# Patient Record
Sex: MALE | Race: BLACK OR AFRICAN AMERICAN | NOT HISPANIC OR LATINO | Employment: UNEMPLOYED | URBAN - METROPOLITAN AREA
[De-identification: names, ages, dates, MRNs, and addresses within clinical notes are randomized per-mention and may not be internally consistent; named-entity substitution may affect disease eponyms.]

---

## 2021-09-26 ENCOUNTER — HOSPITAL ENCOUNTER (EMERGENCY)
Facility: HOSPITAL | Age: 26
Discharge: HOME | End: 2021-09-26
Attending: EMERGENCY MEDICINE | Admitting: EMERGENCY MEDICINE

## 2021-09-26 VITALS
DIASTOLIC BLOOD PRESSURE: 81 MMHG | TEMPERATURE: 98.5 F | SYSTOLIC BLOOD PRESSURE: 129 MMHG | HEIGHT: 67 IN | HEART RATE: 62 BPM | BODY MASS INDEX: 24.48 KG/M2 | OXYGEN SATURATION: 98 % | RESPIRATION RATE: 16 BRPM | WEIGHT: 156 LBS

## 2021-09-26 DIAGNOSIS — Z20.822 ENCOUNTER FOR LABORATORY TESTING FOR COVID-19 VIRUS: ICD-10-CM

## 2021-09-26 LAB — SARS-COV-2 RNA RESP QL NAA+PROBE: NEGATIVE

## 2021-09-26 PROCEDURE — 99283 EMERGENCY DEPT VISIT LOW MDM: CPT

## 2021-09-26 PROCEDURE — C9803 HOPD COVID-19 SPEC COLLECT: HCPCS

## 2021-09-26 PROCEDURE — 87635 SARS-COV-2 COVID-19 AMP PRB: CPT | Performed by: EMERGENCY MEDICINE

## 2021-09-26 ASSESSMENT — MIFFLIN-ST. JEOR: SCORE: 1656.24

## 2021-09-26 NOTE — ED TRIAGE NOTES
Pt requesting Covid 19 test. States family member in the house just tested positive, he is not having any symptoms.

## 2021-09-26 NOTE — Clinical Note
Marcus Olguin was seen and treated in our emergency department on 9/26/2021.  He may return to work on 10/06/2021.       If you have any questions or concerns, please don't hesitate to call.      Marisol Britt MD

## 2021-09-26 NOTE — ED PROVIDER NOTES
EMERGENCY DEPARTMENT ENCOUNTER      NAME: Marcus Olguin  AGE: 26 year old male  YOB: 1995  MRN: [id number]  EVALUATION DATE & TIME: No admission date for patient encounter.    PCP: No primary care provider on file.    ED PROVIDER: Marisol Britt MD    Chief Complaint   Patient presents with     Covid 19 Testing     requesting covid test         FINAL IMPRESSION:  1. Encounter for laboratory testing for COVID-19 virus          ED COURSE & MEDICAL DECISION MAKING:    Pertinent Labs & Imaging studies reviewed. (See chart for details)  26 year old male with history of asthma who presents to the Emergency Department for evaluation of request for Covid testing after being exposed to his roommate who is positive for Covid.  Patient himself asymptomatic but not vaccinated.  Covid swab sent.  Vital signs stable.  Covid swab pending at time of dictation but given the fact that he has had close exposure and is unvaccinated patient getting extracted to quarantine and self isolate for the next 10 days regardless of his Covid test result.         3:17 PM I met with the patient for the initial interview and physical examination. Discussed plan for treatment and workup in the ED.   7:13 PM Discussed with the patient and all questioned fully answered. He will call me if any problems arise.    At the conclusion of the encounter I discussed the results of all of the tests and the disposition. The questions were answered. The patient or family acknowledged understanding and was agreeable with the care plan.    PPE: Provider wore gloves, N95 mask, eye protection, surgical cap, and paper mask.    MEDICATIONS GIVEN IN THE EMERGENCY:  Medications - No data to display    NEW PRESCRIPTIONS STARTED AT TODAY'S ER VISIT  There are no discharge medications for this patient.         =================================================================    HPI    Patient information was obtained from: Patient    Use of Intrepreter:  "N/A       Marcus Olguin is a 26 year old male with a pertinent medical history of mild intermittent asthma who presents to the ED via private car for Covid 19 testing.    Yesterday (9/25), the patient's roommate tested positive for Covid, and the patient was around him previously. The patient shows no symptoms, but is not vaccinated for Covid. He is an otherwise healthy and denies any other complaints.    REVIEW OF SYSTEMS  Constitutional:  Denies fever, chills, weight loss or weakness.  Eyes:  No pain, discharge, redness  HENT:  Denies sore throat, ear pain, congestion  Respiratory: No SOB, wheeze or cough  Cardiovascular:  No CP, palpitations  GI:  Denies abdominal pain, nausea, vomiting, diarrhea  Neurologic:  Denies headache, focal weakness or sensory changes  All other systems negative unless noted in HPI.      PAST MEDICAL HISTORY:  Past Medical History:   Diagnosis Date     Uncomplicated asthma        PAST SURGICAL HISTORY:  History reviewed. No pertinent surgical history.    CURRENT MEDICATIONS:    None       ALLERGIES:  Allergies   Allergen Reactions     Seasonal Allergies        FAMILY HISTORY:  History reviewed. No pertinent family history.    SOCIAL HISTORY:  Social History     Tobacco Use     Smoking status: None   Substance Use Topics     Alcohol use: None     Drug use: None        VITALS:  Patient Vitals for the past 24 hrs:   BP Temp Temp src Pulse Resp SpO2 Height Weight   09/26/21 1442 129/81 98.5  F (36.9  C) Oral 62 16 98 % 1.702 m (5' 7\") 70.8 kg (156 lb)       PHYSICAL EXAM    General Appearance: Well-appearing, well-nourished, no acute distress   Head:  Normocephalic  Eyes:  conjunctiva/corneas clear  ENT:  membranes are moist without pallor  Neck:  Supple  Cardio:  Regular rate and rhythm  Pulm:  No respiratory distress  Extremities: Moves all extremities normally, normal gait  Skin:  Skin warm, dry, no rashes  Neuro:  Alert and oriented ×3, moving all extremities, no gross sensory " defects      The creation of this record is based on the scribe s observations of the work being performed by Marisol Britt MD and the provider s statements to them. It was created on his behalf by Felipa Agrawal, a trained medical scribe. This document has been checked and approved by the attending provider.    Marisol Britt MD  Emergency Medicine  [facility] EMERGENCY DEPARTMENT  [address] [place] [id number] [phone]  Dept: [phone]       Marisol Britt MD  09/26/21 7566

## 2022-01-11 ENCOUNTER — TELEPHONE (OUTPATIENT)
Dept: EMERGENCY MEDICINE | Facility: HOSPITAL | Age: 27
End: 2022-01-11

## 2022-01-11 ENCOUNTER — HOSPITAL ENCOUNTER (EMERGENCY)
Facility: HOSPITAL | Age: 27
Discharge: HOME | End: 2022-01-11
Admitting: PHYSICIAN ASSISTANT
Payer: OTHER GOVERNMENT

## 2022-01-11 VITALS
SYSTOLIC BLOOD PRESSURE: 126 MMHG | HEART RATE: 88 BPM | OXYGEN SATURATION: 97 % | BODY MASS INDEX: 24.33 KG/M2 | HEIGHT: 67 IN | WEIGHT: 155 LBS | TEMPERATURE: 99.9 F | RESPIRATION RATE: 20 BRPM | DIASTOLIC BLOOD PRESSURE: 80 MMHG

## 2022-01-11 DIAGNOSIS — Z20.822 SUSPECTED 2019 NOVEL CORONAVIRUS INFECTION: ICD-10-CM

## 2022-01-11 LAB
ATRIAL RATE - MUSE: 67 BPM
DIASTOLIC BLOOD PRESSURE - MUSE: NORMAL MMHG
FLUAV RNA SPEC QL NAA+PROBE: NEGATIVE
FLUBV RNA RESP QL NAA+PROBE: NEGATIVE
INTERPRETATION ECG - MUSE: NORMAL
P AXIS - MUSE: 74 DEGREES
PR INTERVAL - MUSE: 154 MS
QRS DURATION - MUSE: 104 MS
QT - MUSE: 376 MS
QTC - MUSE: 397 MS
R AXIS - MUSE: -44 DEGREES
SARS-COV-2 RNA RESP QL NAA+PROBE: POSITIVE
SYSTOLIC BLOOD PRESSURE - MUSE: NORMAL MMHG
T AXIS - MUSE: 47 DEGREES
VENTRICULAR RATE- MUSE: 67 BPM

## 2022-01-11 PROCEDURE — 87636 SARSCOV2 & INF A&B AMP PRB: CPT | Performed by: STUDENT IN AN ORGANIZED HEALTH CARE EDUCATION/TRAINING PROGRAM

## 2022-01-11 PROCEDURE — C9803 HOPD COVID-19 SPEC COLLECT: HCPCS

## 2022-01-11 PROCEDURE — 99284 EMERGENCY DEPT VISIT MOD MDM: CPT

## 2022-01-11 PROCEDURE — 93005 ELECTROCARDIOGRAM TRACING: CPT | Performed by: EMERGENCY MEDICINE

## 2022-01-11 ASSESSMENT — ENCOUNTER SYMPTOMS
HEADACHES: 1
CHEST TIGHTNESS: 1
DIARRHEA: 0
COUGH: 0
FEVER: 1
MYALGIAS: 1
NAUSEA: 0
FATIGUE: 1
APPETITE CHANGE: 0
VOMITING: 0
NUMBNESS: 0
SORE THROAT: 0
SHORTNESS OF BREATH: 1
ABDOMINAL PAIN: 0

## 2022-01-11 ASSESSMENT — MIFFLIN-ST. JEOR: SCORE: 1651.71

## 2022-01-11 NOTE — ED TRIAGE NOTES
Patient here for fever, body aches, weakness. SOB. No cough. Appetite decreased. Has not been vaccinated

## 2022-01-11 NOTE — Clinical Note
Marcus Olguin was seen and treated in our emergency department on 1/11/2022.  He may return to work on 01/15/2022.       If you have any questions or concerns, please don't hesitate to call.      Divya Ramirez PA-C

## 2022-01-11 NOTE — ED PROVIDER NOTES
Emergency Department Encounter   NAME: Marcus Olguin ; AGE: 26 year old male ; YOB: 1995 ; MRN: [id number] ; EVALUATION DATE & TIME: No admission date for patient encounter. ; PCP: No Ref-Primary, Physician   ED PROVIDER: Divya Ramirez PA-C    Chief Complaint   Patient presents with     Shortness of Breath     Fever       Medical Decision Making & Final Diagnosis     1. Suspected 2019 novel coronavirus infection         ED Course as of 01/11/22 0841   Tue Jan 11, 2022   0837 Marcus is a 26 year old male with self-reported PMH of asthma who presents to the ED for evaluation of COVID-like symptoms. Reports subjective fever, myalgia, intermittent headache, shortness of breath with exertion, and chest tightness.    My exam is notable for normal vital signs and a nontoxic appearing man.  Clear lung sounds.  No peripheral edema or TTP to bilateral lower extremities.   0837 EKG reveals normal sinus rhythm with sinus arrhythmia.  No acute ST elevation or depression.  No pathologic arrhythmia.  COVID and influenza test pending.   0838 Patient's symptoms most consistent with viral illness.  Suspicious for COVID-19 given unvaccinated status, global pandemic, and recent close family exposure.  Influenza also possible.  He denies any sore throat suspicious for mono or strep.  Lung sounds are clear and he is not coughing.  He has only had symptoms for the last 2 days.  Do not believe there is indication for chest x-ray at this time.  Low suspicion of pneumonia.  Nothing to suggest pneumothorax or pleural effusion.  He has no history of VTE and is PERC negative.  He has no risk factors for ACS.  Chest pain is not exertional and more described as chest tightness associated with mucus buildup.  EKG without evidence of pericarditis or ischemic changes.  EKG also without evidence of cardiac arrhythmia as source of exertional shortness of breath.  No risk factors for severe anemia or metabolic derangement as source of  shortness of breath.  Do not believe there is indication for lab evaluation at this time.  I do think this patient is appropriate for outpatient management and follow-up with primary care.  We discussed symptomatic management for home and I encouraged him to avoid smoking marijuana and drinking liquor while he is not feeling well.  He was given written and verbal discharge instructions.          ED Course   8:24 AM I met and introduced myself to the patient. I gathered initial history and performed my physical exam. We discussed plan for initial workup. PPE worn including N95 mask, surgical gloves, surgical cap.  8:30 AM Discussed plans for discharge.      MEDICATIONS GIVEN IN THE EMERGENCY:   Medications - No data to display   NEW PRESCRIPTIONS STARTED AT TODAY'S ER VISIT:  New Prescriptions    No medications on file     =================================================================   History   Patient information was obtained from: Patient   Use of Intrepreter: N/A  Marcus MARILUZ Olguin is a 26 year old male with self-reported PMH of asthma who presents to the ED for evaluation of COVID-like symptoms.   patient reports his mom currently has COVID and he saw her on Saturday and gave her a hug and kiss.  He is unvaccinated.  Symptoms started on Sunday.  Notes decreased appetite, subjective fever, extreme fatigue, myalgias, intermittent mild headaches, chest tightness and pain that has been constant since Sunday, and shortness of breath with any exertion. Patient notes he has been using marijuana, liquor, and Nyquil for his symptoms.  Denies sore throat, cough, abdominal pain, nausea, vomiting, urinary symptoms, numbness/tingling/swelling in arms or legs, vision changes, diarrhea.  Denies recent surgery or immobilization, history of VTE, cancer diagnosis or treatment, or estrogen use.  ______________________________________________________________________  Past Medical History:   Diagnosis Date     Uncomplicated asthma   "       History reviewed. No pertinent surgical history.    History reviewed. No pertinent family history.    Social History     Tobacco Use     Smoking status: None     Smokeless tobacco: None   Substance Use Topics     Alcohol use: None     Drug use: None       REVIEW OF SYSTEMS:    Review of Systems   Constitutional: Positive for fatigue (\"extreme\") and fever (subjective). Negative for appetite change (decreased appetite).   HENT: Negative for sore throat.    Eyes: Negative for visual disturbance.   Respiratory: Positive for chest tightness (persistent) and shortness of breath (with exertion). Negative for cough.    Cardiovascular: Positive for chest pain (persistent).   Gastrointestinal: Negative for abdominal pain, diarrhea, nausea and vomiting.   Genitourinary: Negative.    Musculoskeletal: Positive for myalgias.        Denies swelling of arms or legs.   Neurological: Positive for headaches (intermittent, mild). Negative for numbness.        Denies paraesthesias.   All other systems reviewed and are negative.        Physical Exam   /80   Pulse 88   Temp 99.9  F (37.7  C)   Resp 20   Ht 1.702 m (5' 7\")   Wt 70.3 kg (155 lb)   SpO2 97%   BMI 24.28 kg/m      Physical Exam  Constitutional:       General: He is not in acute distress.     Appearance: Normal appearance. He is not toxic-appearing.   HENT:      Head: Normocephalic.   Eyes:      Conjunctiva/sclera: Conjunctivae normal.   Cardiovascular:      Rate and Rhythm: Normal rate and regular rhythm.      Heart sounds: Normal heart sounds.   Pulmonary:      Effort: Pulmonary effort is normal. No respiratory distress.      Breath sounds: Normal breath sounds. No wheezing, rhonchi or rales.      Comments: Clear lung sounds.  Abdominal:      General: There is no distension.      Palpations: Abdomen is soft.      Tenderness: There is no abdominal tenderness. There is no guarding or rebound.   Musculoskeletal:         General: No swelling or deformity. " Normal range of motion.      Cervical back: Normal range of motion.      Right lower leg: No tenderness. No edema.      Left lower leg: No tenderness. No edema.   Skin:     General: Skin is warm.   Neurological:      General: No focal deficit present.      Mental Status: He is alert.   Psychiatric:         Mood and Affect: Mood normal.         Lab Work (Reviewed and Interpreted):   Labs Ordered and Resulted from Time of ED Arrival to Time of ED Departure - No data to display    Imaging (Reviewed and Interpreted):   No orders to display       EKG (Reviewed and Interpreted):   Time: 11-JAN-2022 08:15:08  Interpretation: Normal sinus rhythm with sinus arrhythmia. Left axis deviation.  Rate: 67 BPM  AK interval: 154 ms  QRS: 104 ms  QTC: 397 ms  ST changes: No STEMI    No previous available for comparison.    EKG results reviewed and interpreted by Dr. Ayaz Dowd, ED MD.     IKb, am serving as a scribe to document services personally performed by Divya Ramirez PA-C, based on my observation and the provider's statements to me. Divya SPICER PA-C attest that Kb Shermanis acting in a scribe capacity, has observed my performance of the services and has documented them in accordance with my direction.     Divya Ramirez PA-C   Emergency Medicine   [facility] EMERGENCY DEPARTMENT  [facility] [facility] [address]  [phone]  Dept: [phone]      Divya Ramirez PA-C  01/11/22 0905

## 2022-01-11 NOTE — DISCHARGE INSTRUCTIONS
You were seen in the emergency department today for symptoms that are suspicious for Covid-19 virus or influenza.    Take care of yourself at home:  -Rest  -Hydrate  -Isolate    For pain or fever you may take:  -Tylenol 1000 mg every 6 hours or 4 times a day.  Max 4000 mg in 24 hours  -Please do not take this medication with alcohol as it canincrease your risk of liver dysfunction.    For pain or fever that is not well controlled with Tylenol you may consider taking ibuprofen 600 mg every 6 hours.  Max 3500 mg in 24 hours.  Please do not take thismedication if you have a history of a GI bleed or kidney dysfunction.     For cough you may use over-the-counter cough medicine or cough drops.  For nasal congestion you may consider taking Mucinex with a large glass of water.    We will call you if your test results are positive, we will send you a letter or MyChart if it is negative.    Regardless of if you have been tested or not:  Patient who have symptoms(cough, fever, or shortness of breath), need to isolate for 5 days from when symptoms started AND 24 hours after fever resolves (without fever reducing medications) AND improvement of respiratory symptoms(whichever is longer).    Isolate yourself at home (in own room/own bathroom if possible)  Do Not allow any visitors  Do Not go to work or school  Do Not go to [employer], centers, shopping, or other public places.  Do Not shake hands.  Avoid close and intimate contact with others (hugging, kissing).  Follow CDC recommendations for household cleaning of frequently touchedservices.     After the initial 5 days, continue to isolate yourself from household members as much as possible. To continue decrease the risk of community spread and exposure, you and anymembers of your household should limit activities in public for 14 days after starting home isolation.     You can reference the following CDC link for helpful home isolation/care  tips:  https://www.cdc.gov/coronavirus/2019-ncov/downloads/10Things.pdf    Protect Others:  Cover Your Mouth and Nose with a mask, disposable tissue or wash cloth to avoid spreading germs to others.  Wash your hands and face frequently with soap and water    For more informationabout COVID19 and options for caring for yourself at home, please visit the CDC website at https://www.cdc.gov/coronavirus/2019-ncov/about/steps-when-sick.html  For more options for care at [facility], please visit our website at [email or web address]    Return to the emergency department if:  -You are unable to say a full sentence without needing to stop and catch your breath  -You develop severe chest pain  -You notice your breathing has become very difficult  -You have a fever (100.4F or above) that is not going down with Tylenol and ibuprofen  - O2 saturation drops below 90% routinely  Or with any other concerning symptom    Follow up with your primary care provider in the next few days to check in.

## 2022-01-12 NOTE — TELEPHONE ENCOUNTER
"Coronavirus (COVID-19) Notification    Caller Name (Patient, parent, daughter/son, grandparent, etc)  The patient      Reason for call  Notify of Positive Coronavirus (COVID-19) lab results, assess symptoms,  review  [facility] recommendations    Lab Result    Lab test:  2019-nCoV rRt-PCR or SARS-CoV-2 PCR    Oropharyngeal AND/OR nasopharyngeal swabs is POSITIVE for 2019-nCoV RNA/SARS-COV-2 PCR (COVID-19 virus)    RN Recommendations/Instructions per [facility] Coronavirus COVID-19 recommendations    Brief introduction script  Introduce self then review script:  \"I am calling on behalf of [employer].  We were notified that your Coronavirus test (COVID-19) for was POSITIVE for the virus.  I have some information to relay to you but first I wanted to mention that the [place] Dept of Health will be contacting you shortly [it's possible MD already called Patient] to talk to you more about how you are feeling and other people you have had contact with who might now also have the virus.  Also,  [facility] is Partnering with the [facility] for Covid-19 research, you may be contacted directly by research staff.\"    Assessment (Inquire about Patient's current symptoms)   Assessment   Current Symptoms at time of phone call: (if no symptoms, document No symptoms] Headache , fever , eyes feel heavy   Symptoms onset (if applicable) 2  Days ago     If at time of call, Patients symptoms hare worsened, the Patient should contact 911 or have someone drive them to Emergency Dept promptly:      If Patient calling 911, inform 911 personal that you have tested positive for the Coronavirus (COVID-19).  Place mask on and await 911 to arrive.    If Emergency Dept, If possible, please have another adult drive you to the Emergency Dept but you need to wear mask when in contact with other people.      Monoclonal Antibody Administration    You may be eligible to receive a new treatment with a monoclonal antibody for " preventing hospitalization in patients at high risk for complications from COVID-19.   This medication is still experimental and available on a limited basis; it is given through an IV and must be given at an infusion center. Please note that not all people who are eligible will receive the medication since it is in limited supply.     Are you interested in being considered for this medication?  No.   Does the patient fit the criteria: No    Review information with Patient    Your result was positive. This means you have COVID-19 (coronavirus).  We have sent you a letter that reviews the information that I'll be reviewing with you now.    How can I protect others?    If you have symptoms: stay home and away from others (self-isolate) until:    You've had no fever--and no medicine that reduces fever--for 1 full day (24 hours). And       Your other symptoms have gotten better. For example, your cough or breathing has improved. And     At least 10 days have passed since your symptoms started. (If you've been told by a doctor that you have a weak immune system, wait 20 days.)     If you don't have symptoms: Stay home and away from others (self-isolate) until at least 10 days have passed since your first positive COVID-19 test. (Date test collected)    During this time:    Stay in your own room, including for meals. Use your own bathroom if you can.    Stay away from others in your home. No hugging, kissing or shaking hands. No visitors.     Don't go to work, school or anywhere else.     Clean  high touch  surfaces often (doorknobs, counters, handles, etc.). Use a household cleaning spray or wipes. You'll find a full list on the EPA website at www.epa.gov/pesticide-registration/list-n-disinfectants-use-against-sars-cov-2.     Cover your mouth and nose with a mask, tissue or other face covering to avoid spreading germs.    Wash your hands and face often with soap and water.    Make a list of people you have been in close  contact with recently, even if either of you wore a face covering.   - Start your list from 2 days before you became ill or had a positive test.  - Include anyone that was within 6 feet of you for a cumulative total of 15 minutes or more in 24 hours. (Example: if you sat next to Power for 5 minutes in the morning and 10 minutes in the afternoon, then you were in close contact for 15 minutes total that day. Power would be added to your list.)    A public health worker will call or text you. It is important that you answer. They will ask you questions about possible exposures to COVID-19, such as people you have been in direct contact with and places you have visited.    Tell the people on your list that you have COVID-19; they should stay away from others for 14 days starting from the last time they were in contact with you (unless you are told something different from a public health worker).     Caregivers in these groups are at risk for severe illness due to COVID-19:  o People 65 years and older  o People who live in a nursing home or long-term care facility  o People with chronic disease (lung, heart, cancer, diabetes, kidney, liver, immunologic)  o People who have a weakened immune system, including those who:  - Are in cancer treatment  - Take medicine that weakens the immune system, such as corticosteroids  - Had a bone marrow or organ transplant  - Have an immune deficiency  - Have poorly controlled HIV or AIDS  - Are obese (body mass index of 40 or higher)  - Smoke regularly    Caregivers should wear gloves while washing dishes, handling laundry and cleaning bedrooms and bathrooms.    Wash and dry laundry with special caution. Don't shake dirty laundry, and use the warmest water setting you can.    If you have a weakened immune system, ask your doctor about other actions you should take.    For more tips, go to www.cdc.gov/coronavirus/2019-ncov/downloads/10Things.pdf.    You should not go back to work until  you meet the guidelines above for ending your home isolation. You don't need to be retested for COVID-19 before going back to work--studies show that you won't spread the virus if it's been at least 10 days since your symptoms started (or 20 days, if you have a weak immune system).    Employers: This document serves as formal notice of your employee's medical guidelines for going back to work. They must meet the above guidelines before going back to work in person.    How can I take care of myself?    1. Get lots of rest. Drink extra fluids (unless a doctor has told you not to).    2. Take Tylenol (acetaminophen) for fever or pain. If you have liver or kidney problems, ask your family doctor if it's okay to take Tylenol.     Take either:     650 mg (two 325 mg pills) every 4 to 6 hours, or     1,000 mg (two 500 mg pills) every 8 hours as needed.     Note: Don't take more than 3,000 mg in one day. Acetaminophen is found in many medicines (both prescribed and over-the-counter medicines). Read all labels to be sure you don't take too much.    For children, check the Tylenol bottle for the right dose (based on their age or weight).    3. If you have other health problems (like cancer, heart failure, an organ transplant or severe kidney disease): Call your specialty clinic if you don't feel better in the next 2 days.    4. Know when to call 911: Emergency warning signs include:    Trouble breathing or shortness of breath    Pain or pressure in the chest that doesn't go away    Feeling confused like you haven't felt before, or not being able to wake up    Bluish-colored lips or face    5. Sign up for [employer]. We know it's scary to hear that you have COVID-19. We want to track your symptoms to make sure you're okay over the next 2 weeks. Please look for an email from [employer]--this is a free, online program that we'll use to keep in touch. To sign up, follow the link in the email. Learn more at  [email or web address].    Where can I get more information?    [facility]: [email or web address]    Coronavirus Basics: www.health.[facility].[place]/diseases/coronavirus/basics.html    What to Do If You're Sick: www.cdc.gov/coronavirus/2019-ncov/about/steps-when-sick.html    Ending Home Isolation: www.cdc.gov/coronavirus/2019-ncov/hcp/disposition-in-home-patients.html     Caring for Someone with COVID-19: www.cdc.gov/coronavirus/2019-ncov/if-you-are-sick/care-for-someone.html     [facility] clinical trials (COVID-19 research studies): [email or web address].[email or web address]-clinical-trials     A Positive COVID-19 letter will be sent via [employer] or the mail. (Exception, no letters sent to Presurgerical/Preprocedure Patients)    Suzi Werner LPN

## 2022-01-17 ENCOUNTER — HOSPITAL ENCOUNTER (EMERGENCY)
Facility: HOSPITAL | Age: 27
Discharge: HOME | End: 2022-01-17
Attending: EMERGENCY MEDICINE

## 2022-03-04 ENCOUNTER — HOSPITAL ENCOUNTER (EMERGENCY)
Facility: HOSPITAL | Age: 27
Discharge: HOME | End: 2022-03-04
Attending: EMERGENCY MEDICINE | Admitting: EMERGENCY MEDICINE

## 2022-03-04 VITALS
BODY MASS INDEX: 25.06 KG/M2 | WEIGHT: 160 LBS | DIASTOLIC BLOOD PRESSURE: 80 MMHG | SYSTOLIC BLOOD PRESSURE: 147 MMHG | TEMPERATURE: 97.8 F | RESPIRATION RATE: 20 BRPM | OXYGEN SATURATION: 97 % | HEART RATE: 67 BPM

## 2022-03-04 DIAGNOSIS — Z20.822 ENCOUNTER FOR LABORATORY TESTING FOR COVID-19 VIRUS: ICD-10-CM

## 2022-03-04 LAB — SARS-COV-2 RNA RESP QL NAA+PROBE: NEGATIVE

## 2022-03-04 PROCEDURE — 87635 SARS-COV-2 COVID-19 AMP PRB: CPT | Performed by: EMERGENCY MEDICINE

## 2022-03-04 PROCEDURE — C9803 HOPD COVID-19 SPEC COLLECT: HCPCS

## 2022-03-04 PROCEDURE — 99283 EMERGENCY DEPT VISIT LOW MDM: CPT

## 2022-03-04 ASSESSMENT — ENCOUNTER SYMPTOMS
EYE REDNESS: 0
ARTHRALGIAS: 0
DIFFICULTY URINATING: 0
CONFUSION: 0
FEVER: 0
COLOR CHANGE: 0
NECK STIFFNESS: 0
SHORTNESS OF BREATH: 0
HEADACHES: 0
ABDOMINAL PAIN: 0

## 2022-03-04 NOTE — ED TRIAGE NOTES
Pt states he was exposed to his nephew, who has covid 4 days ago.  Pt is asymptomatic currently but would like to get a test to be cleared to go to work.

## 2022-03-04 NOTE — ED PROVIDER NOTES
EMERGENCY DEPARTMENT ENCOUNTER       ED Course & Medical Decision Making     I saw and examined the patient.  He has absolutely no symptoms and is previously healthy.  Oxygenation and work of breathing is normal.  He has a simple exposure to Covid and he needs testing to know if he needs needs to quarantine or he can return back to work.  We did send off his Covid PCR test from here and he will follow this with my chart and he will quarantine until it is verified negative or if it is positive we will follow CDC guidelines which have been laid out for him.          Prior to making a final disposition on this patient the results of patient's tests and other diagnostic studies were discussed with the patient. All questions were answered. Patient expressed understanding of the plan and was amenable to it.    Medications - No data to display    Final Impression     1. Encounter for laboratory testing for COVID-19 virus            Chief Complaint     Chief Complaint   Patient presents with     wants covid test       Pt states he was exposed to his nephew, who has covid 4 days ago.  Pt is asymptomatic currently but would like to get a test to be cleared to go to work.      DELROY       Marcus Olguin is a 26 year old male who presents for evaluation of exposure to COVID-19.  He was exposed by family member a few days ago.  He does not have any symptoms but his job will not him return and he wants to make sure that he is negative so is not spreading it.  He is otherwise young, healthy and has no respiratory problems takes no prescription medications and has no other complaints.    Past Medical History     Past Medical History:   Diagnosis Date     Uncomplicated asthma      No past surgical history on file.  No family history on file.   Social History     Tobacco Use     Smoking status: Not on file     Smokeless tobacco: Not on file   Substance Use Topics     Alcohol use: Not on file     Drug use: Not on file       Relevant  past medical, surgical, family and social history as documented above, has been reviewed and discussed with patient. No changes or additions, unless otherwise noted in the HPI.    Current Medications     No current outpatient medications on file.      Allergies     Allergies   Allergen Reactions     Seasonal Allergies        Review of Systems     Review of Systems   Constitutional: Negative for fever.   HENT: Negative for congestion.    Eyes: Negative for redness.   Respiratory: Negative for shortness of breath.    Cardiovascular: Negative for chest pain.   Gastrointestinal: Negative for abdominal pain.   Genitourinary: Negative for difficulty urinating.   Musculoskeletal: Negative for arthralgias and neck stiffness.   Skin: Negative for color change.   Neurological: Negative for headaches.   Psychiatric/Behavioral: Negative for confusion.        Remainder of systems reviewed, unless noted in HPI all others negative.    Physical Exam     BP (!) 147/80   Pulse 67   Temp 97.8  F (36.6  C) (Oral)   Resp 20   Wt 72.6 kg (160 lb)   SpO2 97%   BMI 25.06 kg/m      Physical Exam  Vitals and nursing note reviewed.   Constitutional:       General: He is not in acute distress.  HENT:      Head: Normocephalic.      Nose: Nose normal.   Eyes:      General: No scleral icterus.  Cardiovascular:      Rate and Rhythm: Normal rate.   Pulmonary:      Effort: Pulmonary effort is normal.   Musculoskeletal:      Cervical back: Neck supple.   Skin:     Findings: No rash.   Neurological:      Mental Status: He is alert. Mental status is at baseline.   Psychiatric:         Mood and Affect: Mood normal.             Labs & Imaging         Labs Ordered and Resulted from Time of ED Arrival to Time of ED Departure - No data to display           Andre Puckett MD  Emergency Medicine  [facility] EMERGENCY DEPARTMENT  [address] [place] [id number]  [phone]  3/4/2022         Andre Puckett,  MD  03/04/22 1413

## 2023-10-07 ENCOUNTER — HOSPITAL ENCOUNTER (EMERGENCY)
Facility: CLINIC | Age: 28
Discharge: HOME | End: 2023-10-07
Attending: EMERGENCY MEDICINE | Admitting: EMERGENCY MEDICINE

## 2023-10-07 VITALS
SYSTOLIC BLOOD PRESSURE: 141 MMHG | OXYGEN SATURATION: 100 % | DIASTOLIC BLOOD PRESSURE: 102 MMHG | HEART RATE: 84 BPM | RESPIRATION RATE: 20 BRPM | TEMPERATURE: 98.1 F

## 2023-10-07 DIAGNOSIS — F43.0 ACUTE REACTION TO SITUATIONAL STRESS: ICD-10-CM

## 2023-10-07 PROBLEM — F43.23 ADJUSTMENT DISORDER WITH MIXED ANXIETY AND DEPRESSED MOOD: Status: ACTIVE | Noted: 2023-10-07

## 2023-10-07 PROCEDURE — 99283 EMERGENCY DEPT VISIT LOW MDM: CPT

## 2023-10-07 ASSESSMENT — ACTIVITIES OF DAILY LIVING (ADL): ADLS_ACUITY_SCORE: 35

## 2023-10-07 NOTE — CONSULTS
"Diagnostic Evaluation Consultation  Crisis Assessment    Patient Name: Marcus Olguin  Age:  28 year old  Legal Sex: male  Gender Identity: male  Pronouns:   Race: Black or   Ethnicity: Not  or   Language: English      Patient was assessed: Virtual: [employer] Crisis Assessment Start Time: 0820 Crisis Assessment Stop Time: 0853  Patient location: [facility] EMERGENCY DEPT                                 Referral Data and Chief Complaint  Marcus Olguin presents to the ED via police. Patient is presenting to the ED for the following concerns: Verbal agitation, Suicidal ideation, Other (see comment), Physical aggression (Acute stress due to finding fiance cheating on him in hotel room.).   Factors that make the mental health crisis life threatening or complex are:  Patient was brought to the ED by police.  Patient reports he found his fiance cheating on him at a hotel room. Patient reports after opening the hotel room door he left, went out to the parking lot, and his fiance \"called the ambulance to make sure I was okay\".  Patient stated that in the moment he had the thought \"I should just end it all\" but went on to think \"I have a good personality.  I'm genuine.  I was at the point where I was like fuck it all.  I know that's not a smart choice and at the end of the day my mom is the best therapy I can get\".  Patient denied any current suicidal ideation and stated \"I'd rather cry it out\".   Writer notes that ED staff shared after the assessment that police will be placing patient under arrest.  Writer spoke with nursing staff at 9:15 am who report patient remains cooperative and calm as police are there to arrest him..      Informed Consent and Assessment Methods  Explained the crisis assessment process, including applicable information disclosures and limits to confidentiality, assessed understanding of the process, and obtained consent to proceed with the assessment.  " Assessment methods included conducting a formal interview with patient, review of medical records, collaboration with medical staff, and obtaining relevant collateral information from family and community providers when available.  : done     Patient response to interventions: acceptance expressed  Coping skills were attempted to reduce the crisis:  Not applicable.  Patient presents in ED due to an acute situation.  Patient identified his mom and family as his primary supports.     History of the Crisis   Patient reports he first began to suspect his fiance was cheating on him on September 27th after finding charges for hotel rooms on a shared credit card.  Patient stated since then he has had increasing anxiety as he has waited for more charges to appear.  Patient reports no mental health concerns prior to this acute situation.    Brief Psychosocial History  Family:  Lives with Significant Other, Children no  Support System:  Parent(s), Sibling(s)  Employment Status:     Source of Income:  salary/wages, other (see comments) (Working in [employer])  Financial Environmental Concerns:  No concerns identified  Current Hobbies:  exercise/fitness, sports/team sports  Barriers in Personal Life:       Significant Clinical History  Current Anxiety Symptoms:  anxious (Anxious as anticipated additional hotel charge.)  Current Depression/Trauma:  sadness, crying or feels like crying (Patient reported thoughts of suicide in the moments after finding his fiance cheating but denies any since that time.)  Current Somatic Symptoms:     Current Psychosis/Thought Disturbance:     Current Eating Symptoms:     Chemical Use History:  Alcohol: Social (On the weekends.)  Benzodiazepines: None  Opiates: None  Cocaine: None  Marijuana: Daily  Last Use:: 10/06/23  Other Use: None   Past diagnosis:  No known past diagnosis  Family history:  No known history of mental health or chemical health concerns  Past treatment:  No known formal treatment  attempts  Details of most recent treatment:  Patient denies any mental health treatment history.  Other relevant history:          Collateral Information  Is there collateral information: No (Patient reports no one knows he is currently at the ED and is adament he wants to keep this situation private due to embarrassment about his fiance's cheating.)        Risk Assessment  [place] Suicide Severity Rating Scale Full Clinical Version: 10/7/23  Suicidal Ideation  Q1 Wish to be Dead (Lifetime): Yes  Q2 Non-Specific Active Suicidal Thoughts (Lifetime): Yes  3. Active Suicidal Ideation with any Methods (Not Plan) Without Intent to Act (Lifetime): No  Q4 Active Suicidal Ideation with Some Intent to Act, Without Specific Plan (Lifetime): No  Q5 Active Suicidal Ideation with Specific Plan and Intent (Lifetime): No  Q6 Suicide Behavior (Lifetime): no     Suicidal Behavior (Lifetime)  Actual Attempt (Lifetime): No  Has subject engaged in non-suicidal self-injurious behavior? (Lifetime): No  Interrupted Attempts (Lifetime): No  Aborted or Self-Interrupted Attempt (Lifetime): No  Preparatory Acts or Behavior (Lifetime): No    [place] Suicide Severity Rating Scale Recent: 10/7/23  Suicidal Ideation (Recent)  Q1 Wished to be Dead (Past Month): yes  Q2 Suicidal Thoughts (Past Month): no  Q3 Suicidal Thought Method: no  Q4 Suicidal Intent without Specific Plan: no  Q5 Suicide Intent with Specific Plan: no  Level of Risk per Screen: low risk     Suicidal Behavior (Recent)  Actual Attempt (Past 3 Months): No  Total Number of Actual Attempts (Past 3 Months): 0  Has subject engaged in non-suicidal self-injurious behavior? (Past 3 Months): No  Interrupted Attempts (Past 3 Months): No  Total Number of Interrupted Attempts (Past 3 Months): 0  Aborted or Self-Interrupted Attempt (Past 3 Months): No  Total Number of Aborted or Self-Interrupted Attempts (Past 3 Months): 0  Preparatory Acts or Behavior (Past 3 Months): No  Total Number of  Preparatory Acts (Past 3 Months): 0    Environmental or Psychosocial Events: loss of a relationship due to divorce/separation, recent life events (see comment) (Found fiance cheating on him at a hotel room last night.)  Protective Factors: Protective Factors: strong bond to family unit, community support, or employment, sense of importance of health and wellness, sense of self-efficacy and/or positive self-esteem, optimistic outlook - identification of future goals    Does the patient have thoughts of harming others? Feels Like Hurting Others: no  Previous Attempt to Hurt Others: no  Current presentation:  (Falling asleep.)  Is the patient engaging in sexually inappropriate behavior?: no    Is the patient engaging in sexually inappropriate behavior?  no        Mental Status Exam   Affect: Appropriate  Appearance: Appropriate  Attention Span/Concentration: Inattentive  Eye Contact: Engaged    Fund of Knowledge: Appropriate   Language /Speech Content: Fluent  Language /Speech Volume: Normal  Language /Speech Rate/Productions: Normal  Recent Memory: Intact  Remote Memory: Intact  Mood: Normal (Patient is falling asleep during assessment)  Orientation to Person: Yes   Orientation to Place: Yes  Orientation to Time of Day: Yes  Orientation to Date: Yes     Situation (Do they understand why they are here?): Yes  Psychomotor Behavior: Normal  Thought Content: Clear  Thought Form: Goal Directed        Medication  Psychotropic medications:   Medication Orders - Psychiatric (From admission, onward)      None             Current Care Team  Patient Care Team:  No Ref-Primary, Physician as PCP - General    Diagnosis  Patient Active Problem List   Diagnosis Code    Adjustment Disorder with mixed anxiety and depressed mood F43.23       Primary Problem This Admission  Active Hospital Problems    Adjustment Disorder with mixed anxiety and depressed mood        Clinical Summary and Substantiation of Recommendations   After  "therapeutic assessment, intervention and aftercare planning by ED care team and [facility] and in consultation with attending provider, the patient's circumstances and mental state were appropriate for outpatient management. It is the recommendation of this clinician that pt discharge with OP MH support. A this time the pt is not presenting as an acute risk to self or others due to the following factors: patient denies any current suicidal ideation and shared that the comments he made were in acute distress after finding his fiance cheating on him.  Patient reports no SI, HI or NSSI and willingly participated in safety planning.  Patient talked at length about the importance of his family and identified his mom as a support saying \"she is the best therapy I can get\".  Patient made plan to call mom on his way home from hospital and have her meet him at his house, help him pack up his things, and then said he would return to his mom's house with her.  Writer notes after assessment was complete ED staff informed writer patient will be arrested by police upon discharge.  Writer notes the news of his arrest could potentially change patient's mental state.  Writer spoke with nurse after learning of arrest and nursing staff reported patient continues to be calm and cooperative while being arrested by police; no additional comments or reports of SI have been made by patient.      Patient coping skills attempted to reduce the crisis:  Not applicable.  Patient presents in ED due to an acute situation.  Patient identified his mom and family as his primary supports.    Disposition  Recommended disposition: Other. please comment, Individual Therapy (Discharge recommended.  Patient stated his mom is \"the best therapy I can get\".  Writer was informed after assessment by ED staff police will be arresting patient upon discharge from ED.)        Reviewed case and recommendations with attending provider. Attending Name: Dr. Sheffield     "   Attending concurs with disposition: yes       Patient and/or validated legal guardian concurs with disposition:   yes       Final disposition:  discharge    Assessment Details   Total duration spent with the patient: 33 min     CPT code(s) utilized: 01090 - Psychotherapy for Crisis - 60 (30-74*) min    MUMTAZ Alcala, Psychotherapist  [facility] - Triage & Transition Services  Callback: [phone]

## 2023-10-07 NOTE — DISCHARGE INSTRUCTIONS
Aftercare Plan  If I am feeling unsafe or I am in a crisis, I will:   Contact my established care providers   Call the National Suicide Prevention Lifeline: 988  Go to the nearest emergency room   Call 911     Warning signs that I or other people might notice when a crisis is developing for me:   Getting overly anxious, overlooking a lot of things.  If my appetite changes, if I have trouble sleeping, if I find myself tearful, crying all day without being able stop.    Things I am able to do on my own to cope or help me feel better: Go on walks.    Things that I am able to do with others to cope or help me better: Play sports, spend quality time with family, siblings.      Things I can use or do for distraction: Go on a walk, read, watch a funny television show.    Changes I can make to support my mental health and wellness: Talk to mom.    People in my life that I can ask for help: My mom.    Your [facility] has a mental health crisis team you can call 24/7: [facility]  [phone]    Other things that are important when I'm in crisis: Avoid and/or reduce use of alcohol and marijuana.    Additional resources and information:   Substance Use Disorder Direct Access Resources    It is recommended that you abstain from all mood altering chemicals. Please contact the sober support hotline (430-468-1333) as needed; phones are answered 24 hours a day, 7 days a week.    To access substance use treatment you must have a comprehensive assessment completed to begin any treatment program.     If uninsured, please contact your [facility] of residence for eligibility screen to substance use disorder evaluation and treatment:    [place] - [phone]   [facility] [phone]   [facility] [phone]   [place] - [phone]   [place] - [phone]   [facility] [phone]   [facility] [phone]   [place] - [phone]     If you have private insurance, call the customer service number on the back of your insurance card to  find an in-network substance abuse use disorder assessment. The ideal provider will be a treatment facility, licensed in the state of [place].     Community ADRIANA Evaluations: Clients may call their county for a full list of providers - Availability and services listed belo are subject to change, please call the provider to confirm    [facility]  [phone]  [facility] [place], [place], [address]  *Please call the above number to schedule a comprehensive assessment for determination of level of care needs. In person and virtual appointments available Mon-Fri.    [facility], [facility] [address] Floor, [address], [place] [address] (next to the outpatient lab)    Phone: [phone]   Provides bridging services to people with Opiate Use Disorders (OUD) seeking care. This is a front door to Medication Assisted Treatments (MAT), ages 16+  Walk In hours: Monday-Friday 9:00am-3:00pm    [facility]  [phone]  Walk in Assessments: Mon-Friday 7a-1:45p  [address], [place], [address]    [facility]  Central Access [phone]  [facility] [place], [place], [address]  *by appointment only    [facility]  [phone] (phone consultation available )  Locations in: [place], [place], [place], [place], and [place], [place]  [facility] virtual IOP [phone] or visit Cindy.[employer]/SATURNINO   Also offers LGBTQ programming     [facility]  [phone]  [address]  [place], [place], [address]  *Currently only offered via telehealth - call to set up an appointment    [facility]  [address] [place], [place], [address]  [facility]  For more information to to make a referral call:  [phone]  Walk-in on   9-11 a.m.    [facility]  [phone]  [address] [place], [place], [address]  *available by appointments  only    [facility]  [phone]  [address] [place], [place], [address]  *available by appointment only    [facility]  [phone]  [address] [place], [place], [address]  *walk in assessments available M-F starting at 7 am.    [facility]  [phone]  Locations: [place], [facility], [facility], and [place]  *Walk in assessments availble M-F starting at 8 am -virtual only    [facility]  [phone]  [address] [place] [address]    [facility]  Virtual + Locations: [place], [place], [place], [place], [facility], [facility], [place], [place]   [phone]  *available by appointment only    [facility]  [phone]  [address]  [place], [place], [address]    [facility]  [phone]  [address] [place], [place], [address]  *available by appointment    [facility]  [phone]  [address] [place], [place], [address]  *walk ins available M-TH from 9-3    [facility]  MAT program: [phone]  [address] [place], [place], [address]    [facility]  [phone]  Same day substance use disorder assessments are available Monday - Friday, via walk-in or by appointment at the [place] location.  [address], [place] [address]     [facility] - adolescent and adult SUDs services  [phone]  Offer services Monday through Friday, as well as evening hours Monday through Thursday. Normally, a first appointment will be scheduled within one week  [email or web address]  Locations all over [place]    If you are intoxicated, you may be required to detox at a detox facility before starting treatment. The following are detox facilities that you can self present to. All detox facilities are able to help you complete an assessment prior to discharge if you choose:    [facility]:  Arrive at a [place] [facility] for immediate medical evaluation    [facility]: [address] [place], [place], [address].         [phone]    [facility]: [address] [place], [place], [address]  [phone]     [facility]): [address] [place], [place], [address]  [phone]     [facility]: [address] [place], [address], [phone]         Ways to help cope with sobriety:    -- Take prescribed medicines as scheduled  -- Keep follow-up appointments  -- Talk to others about your concerns  -- Get regular exercise  -- Practice deep breathing skills  -- Eat a healthy diet  -- Use community resources, including hotline numbers, [facility] crisis and support meetings  -- Stay sober and avoid places/people/things associated with substance use  --Maintain a daily schedule/routine  --Get at least 7-8 hours of sleep per night  --Create a list 10--20 healthy activities that you can do that are enjoyable and do not involve substance use  --Create daily goals (approx. 1-4 goals) per day and work to achieve them throughout the day.       Free Resources:    [place] Recovery Connection ([facility])  [facility] connects people seeking recovery to resources that help foster and sustain long-term recovery. Whether you are seeking resources for treatment, transportation, housing, job training, education, health care or other pathways to recovery, [facility] is a great place to start.  Phone: 336.658.6139. www.[employer].org (Great listing of all types of recovery and non-recovery related resources)    Alcoholics Anonymous  Phone: 2-911-ALCOHOL  Website: HTTP://WWW.AA.ORG/  AA [place] (440-021-5261 or http://a[place].org)  [facility] [place] (447-920-7677 or www.aastpaul.org)     Narcotics Anonymous  Phone: 454.915.8023  Website: www.[employer].    [facility]  [address] [place] [address], [address], [place], [place],  Phone: 604.349.2012  Drop-in  "Hours: Monday-Friday 9-11:30 am. By appointment at other times.  Provides: [facility] is a drop-in center on the [facility] side [facility] that provides a safe space for individuals who are homeless and have a history of chemical use. Sobriety is not a requirement but drugs and alcohol are not allowed on the property.  Services: Non-clients can access drop-in services such as Recovery and Harm Reduction Groups, referrals to case management, community activities, shower facilities, and a pool table. Individuals who are homeless and have chemical health needs may be eligible for enrollment into [facility]'s case management program. Clients and  work together to access benefits, treatment, health care, shelter, and external housing resources.            Crisis Lines  Crisis Text Line  Text 438975  You will be connected with a trained live crisis counselor to provide support.    Por michael, texto  MIHAELA a 999402 o texto a 442-AYUDAME en WhatsApp    [facility] (LGBTQ Youth Crisis Line)  9.225.693.1301  text START to 656-930      [employer]  Fast Tracker  Linking people to mental health and substance use disorder resources  fasttrackLineRate Systemsn.org     [place] Mental Health Warm Line  Peer to peer support  Monday thru Saturday, 12 pm to 10 pm  501.293.2845 or 2.713.240.5097  Text \"Support\" to 38264    National [place] on Mental Illness (GLORIA)  577.852.3176 or 1.888.GLORIA.HELPS      Mental Health Apps  My3  https://myYasuupp.org/    VirtualHopeBox  https://getbetter!.org/apps/virtual-hope-box/      Additional Information  Today you were seen by a licensed mental health professional through Triage and Transition services, [facility] ([facility])  for a crisis assessment in the Emergency Department at [facility].  It is recommended that you follow up with your established providers (psychiatrist, mental health therapist, and/or primary care doctor - as " relevant) as soon as possible. Coordinators from [facility] will be calling you in the next 24-48 hours to ensure that you have the resources you need.  You can also contact [facility] coordinators directly at [phone]. You may have been scheduled for or offered an appointment with a mental health provider. [facility] maintains an extensive network of licensed behavioral health providers to connect patients with the services they need.  We do not charge providers a fee to participate in our referral network.  We match patients with providers based on a patient's specific needs, insurance coverage, and location.  Our first effort will be to refer you to a provider within your care system, and will utilize providers outside your care system as needed.

## 2023-10-07 NOTE — ED NOTES
PD here to take patient to residential. Patient calm and cooperative with staff and PD.   Patient eligible for discharge. Patient education including discharge instruction, medication review, pain management, and follow-up care discussed. Patient/representative verbalized understanding of education. Patient/representative able to preform teach back education. All questions answered and concerns addressed. IV removed. All belongings sent home with patient/representative. Patient stable/improved at time of discharge.

## 2023-10-07 NOTE — ED PROVIDER NOTES
History     Chief Complaint:  Psychiatric Evaluation       The history is provided by the patient.      Marcus Olguin is a 28 year old male who presents for a psychiatric evaluation. He discovered that his girlfriend was cheating on him last night and went to confront her at a hotel. The police were called and he was arrested for domestic assault. While being placed under arrest, he made suicidal statements about running into traffic and killing himself. He was eventually brought to the ED by the police and EMS. He denies having suicidal ideations at bedside, and notes that he made the statements because he was under a lot of stress. He endorses having a shot of alcohol last night, but denies drinking alcohol on a daily basis. Also, he frequently smokes marijuana. Other recent life stressors mentioned at because include both this father and biological mother passing away. There is no history of psychiatric issues and he denies hallucinations.     Independent Historian:   None - Patient Only    Review of External Notes:   None     Medications:    The patient denies current use of medications.     Past Medical History:    Asthma     Past Surgical History:    Adenoidectomy      Physical Exam   Patient Vitals for the past 24 hrs:   BP Temp Pulse Resp SpO2   10/07/23 0812 (!) 141/102 98.1  F (36.7  C) 84 20 100 %        Physical Exam  Vitals reviewed.   Constitutional:       General: He is not in acute distress.     Appearance: He is not ill-appearing.   HENT:      Head: Normocephalic and atraumatic.   Eyes:      Extraocular Movements: Extraocular movements intact.   Cardiovascular:      Rate and Rhythm: Normal rate and regular rhythm.   Pulmonary:      Effort: Pulmonary effort is normal. No respiratory distress.      Breath sounds: Normal breath sounds. No wheezing.   Abdominal:      Palpations: Abdomen is soft.      Tenderness: There is no abdominal tenderness. There is no guarding.   Musculoskeletal:      Cervical  back: Normal range of motion.   Skin:     General: Skin is warm and dry.   Neurological:      Mental Status: He is alert and oriented to person, place, and time.      GCS: GCS eye subscore is 4. GCS verbal subscore is 5. GCS motor subscore is 6.   Psychiatric:         Behavior: Behavior normal.      Comments: Denies any suicidal  or homicidal ideations.  Denies any Hallucinations           Emergency Department Course   Emergency Department Course & Assessments:    Interventions:  Medications - No data to display     Assessments:  0748 I obtained history and examined the patient as noted above.  09 I rechecked the patient and explained findings.    Independent Interpretation (X-rays, CTs, rhythm strip):  None    Consultations/Discussion of Management or Tests:   ED Course as of 10/07/23 1029   Sat Oct 07, 2023   0855 Discussed with Lois with DEC. Recommends discharge. Pt has no current SI. States that pt said SI when he found out his girlfriend was cheating on him.    0856 I spoke with DEC.   904 Pt discharged to police custody     Social Determinants of Health affecting care:   Legal Problems/Incarceration and Stress/Adjustment Disorders    Disposition:  The patient was discharged to police custody.      Impression & Plan    Medical Decision Makin yo M presents with psych eval. patient brought in by police.  He states that his girlfriend was cheating on him and he found out last night, this morning.  He states that he told police that he wanted to kill himself, states that he was just saying that because he was upset.  He states that he would never kill himself.  He has no prior psychiatric history.  Denies any history of depression, anxiety, schizophrenia, bipolar.  Was evaluated by DEC.  He is cleared for discharge at this time.  Patient be discharged into police custody at this time.    Diagnosis:    ICD-10-CM    1. Acute reaction to situational stress  F43.0            Discharge Medications:  There  are no discharge medications for this patient.         Scribe Disclosure:  I, Kobi Cabello, am serving as a scribe at 8:27 AM on 10/7/2023 to document services personally performed by Saida Sheffield DO based on my observations and the provider's statements to me.   10/7/2023   Saida Sheffield DO Doan, Tiffani, DO  10/07/23 1031

## 2023-10-07 NOTE — ED TRIAGE NOTES
Patient brought in by EMS in restraints. Patient found significant other in a hotel room cheating on him. Patient made statements to PD and EMS that he wanted to run out into traffic and kill himself. Patient is yelling and crying during triage.

## 2023-10-24 ENCOUNTER — HOSPITAL ENCOUNTER (EMERGENCY)
Facility: CLINIC | Age: 28
Discharge: HOME | End: 2023-10-26
Attending: EMERGENCY MEDICINE | Admitting: EMERGENCY MEDICINE

## 2023-10-24 ENCOUNTER — TELEPHONE (OUTPATIENT)
Dept: BEHAVIORAL HEALTH | Facility: CLINIC | Age: 28
End: 2023-10-24

## 2023-10-24 DIAGNOSIS — T50.902A INTENTIONAL DRUG OVERDOSE, INITIAL ENCOUNTER (H): ICD-10-CM

## 2023-10-24 DIAGNOSIS — R03.0 ELEVATED BLOOD PRESSURE READING WITHOUT DIAGNOSIS OF HYPERTENSION: ICD-10-CM

## 2023-10-24 DIAGNOSIS — R45.851 SUICIDAL IDEATION: ICD-10-CM

## 2023-10-24 LAB
ALBUMIN SERPL BCG-MCNC: 5.2 G/DL (ref 3.5–5.2)
ALP SERPL-CCNC: 65 U/L (ref 40–129)
ALT SERPL W P-5'-P-CCNC: 22 U/L (ref 0–70)
ANION GAP SERPL CALCULATED.3IONS-SCNC: 14 MMOL/L (ref 7–15)
APAP SERPL-MCNC: 29.5 UG/ML (ref 10–30)
APAP SERPL-MCNC: 48.8 UG/ML (ref 10–30)
AST SERPL W P-5'-P-CCNC: 29 U/L (ref 0–45)
BASOPHILS # BLD AUTO: 0 10E3/UL (ref 0–0.2)
BASOPHILS NFR BLD AUTO: 0 %
BILIRUB SERPL-MCNC: 1.7 MG/DL
BUN SERPL-MCNC: 10 MG/DL (ref 6–20)
CALCIUM SERPL-MCNC: 10.3 MG/DL (ref 8.6–10)
CHLORIDE SERPL-SCNC: 101 MMOL/L (ref 98–107)
CREAT SERPL-MCNC: 1.37 MG/DL (ref 0.67–1.17)
DEPRECATED HCO3 PLAS-SCNC: 25 MMOL/L (ref 22–29)
EGFRCR SERPLBLD CKD-EPI 2021: 73 ML/MIN/1.73M2
EOSINOPHIL # BLD AUTO: 0.1 10E3/UL (ref 0–0.7)
EOSINOPHIL NFR BLD AUTO: 2 %
ERYTHROCYTE [DISTWIDTH] IN BLOOD BY AUTOMATED COUNT: 13.1 % (ref 10–15)
ETHANOL SERPL-MCNC: <0.01 G/DL
GLUCOSE SERPL-MCNC: 84 MG/DL (ref 70–99)
HCT VFR BLD AUTO: 50.1 % (ref 40–53)
HGB BLD-MCNC: 16.7 G/DL (ref 13.3–17.7)
IMM GRANULOCYTES # BLD: 0 10E3/UL
IMM GRANULOCYTES NFR BLD: 0 %
LYMPHOCYTES # BLD AUTO: 1.4 10E3/UL (ref 0.8–5.3)
LYMPHOCYTES NFR BLD AUTO: 24 %
MCH RBC QN AUTO: 30.5 PG (ref 26.5–33)
MCHC RBC AUTO-ENTMCNC: 33.3 G/DL (ref 31.5–36.5)
MCV RBC AUTO: 91 FL (ref 78–100)
MONOCYTES # BLD AUTO: 0.6 10E3/UL (ref 0–1.3)
MONOCYTES NFR BLD AUTO: 10 %
NEUTROPHILS # BLD AUTO: 3.5 10E3/UL (ref 1.6–8.3)
NEUTROPHILS NFR BLD AUTO: 64 %
NRBC # BLD AUTO: 0 10E3/UL
NRBC BLD AUTO-RTO: 0 /100
PLATELET # BLD AUTO: 275 10E3/UL (ref 150–450)
POTASSIUM SERPL-SCNC: 3.8 MMOL/L (ref 3.4–5.3)
PROT SERPL-MCNC: 8.5 G/DL (ref 6.4–8.3)
RBC # BLD AUTO: 5.48 10E6/UL (ref 4.4–5.9)
SALICYLATES SERPL-MCNC: <0.3 MG/DL
SODIUM SERPL-SCNC: 140 MMOL/L (ref 135–145)
WBC # BLD AUTO: 5.6 10E3/UL (ref 4–11)

## 2023-10-24 PROCEDURE — 80143 DRUG ASSAY ACETAMINOPHEN: CPT | Performed by: EMERGENCY MEDICINE

## 2023-10-24 PROCEDURE — 36415 COLL VENOUS BLD VENIPUNCTURE: CPT | Performed by: EMERGENCY MEDICINE

## 2023-10-24 PROCEDURE — 80053 COMPREHEN METABOLIC PANEL: CPT | Performed by: EMERGENCY MEDICINE

## 2023-10-24 PROCEDURE — 99285 EMERGENCY DEPT VISIT HI MDM: CPT

## 2023-10-24 PROCEDURE — 82077 ASSAY SPEC XCP UR&BREATH IA: CPT | Performed by: EMERGENCY MEDICINE

## 2023-10-24 PROCEDURE — 80179 DRUG ASSAY SALICYLATE: CPT | Performed by: EMERGENCY MEDICINE

## 2023-10-24 PROCEDURE — 93005 ELECTROCARDIOGRAM TRACING: CPT

## 2023-10-24 PROCEDURE — 85025 COMPLETE CBC W/AUTO DIFF WBC: CPT | Performed by: EMERGENCY MEDICINE

## 2023-10-24 RX ORDER — OLANZAPINE 5 MG/1
10 TABLET, ORALLY DISINTEGRATING ORAL 2 TIMES DAILY PRN
Status: DISCONTINUED | OUTPATIENT
Start: 2023-10-24 | End: 2023-10-26 | Stop reason: HOSPADM

## 2023-10-24 RX ORDER — LORAZEPAM 1 MG/1
1 TABLET ORAL EVERY 8 HOURS PRN
Status: DISCONTINUED | OUTPATIENT
Start: 2023-10-24 | End: 2023-10-26 | Stop reason: HOSPADM

## 2023-10-24 RX ORDER — IBUPROFEN 600 MG/1
600 TABLET, FILM COATED ORAL EVERY 6 HOURS PRN
Status: DISCONTINUED | OUTPATIENT
Start: 2023-10-24 | End: 2023-10-26 | Stop reason: HOSPADM

## 2023-10-24 RX ORDER — LANOLIN ALCOHOL/MO/W.PET/CERES
3 CREAM (GRAM) TOPICAL
Status: DISCONTINUED | OUTPATIENT
Start: 2023-10-24 | End: 2023-10-26 | Stop reason: HOSPADM

## 2023-10-24 RX ORDER — HYDROXYZINE HYDROCHLORIDE 25 MG/1
25 TABLET, FILM COATED ORAL
Status: DISCONTINUED | OUTPATIENT
Start: 2023-10-24 | End: 2023-10-26 | Stop reason: HOSPADM

## 2023-10-24 ASSESSMENT — ACTIVITIES OF DAILY LIVING (ADL)
ADLS_ACUITY_SCORE: 35

## 2023-10-24 NOTE — ED TRIAGE NOTES
Pt brought in by EMS on a hold by PD, pt threatened to commit suicide, EMS states he took a handful of Tylenol PTA. Unsure the dosage. Pt unpredictable at times per EMS. ABC's intact, alert.

## 2023-10-24 NOTE — ED NOTES
"Patient yelling loudly in room, watching television. RN asked patient if everything was Ok, patient states, \"I'm watching the [employer] game, I'm a [place] fan.\" RN informed patient he can not yell in room. Patient verbalized understanding at this time. Updated patient on next lab draw at 1915. Pt responds, \"So when can I go home?\" Updated MD that patient is requesting to speak to him. Again offered patient food and water, patient declined.   "

## 2023-10-24 NOTE — ED NOTES
IP MH Referral Acuity Rating Score (RARS)    LMHP complete at referral to IP MH, with DEC; and, daily while awaiting IP MH placement. Call score to PPS.  CRITERIA SCORING   New 74 HH and Involuntary for IP MH (not adolescent) 1/1   Boarding over 24 hours 0/1   Vulnerable adult at least 55+ with multiple co morbidities; or, Patient age 11 or under 0/1   Suicide ideation without relief of precipitating factors 1/1   Current plan for suicide 1/1   Current plan for homicide 0/1   Imminent risk or actual attempt to seriously harm another without relief of factors precipitating the attempt 0/1   Severe dysfunction in daily living (ex: complete neglect for self care, extreme disruption in vegetative function, extreme deterioration in social interactions) 1/1   Recent (last 2 weeks) or current physical aggression in the ED 0/1   Restraints or seclusion episode in ED 0/1   Verbal aggression, agitation, yelling, etc., while in the ED 0/1   Active psychosis with psychomotor agitation or catatonia 0/1   Need for constant or near constant redirection (from leaving, from others, etc).  0/1   Intrusive or disruptive behaviors 0/1   TOTAL Acuity Total Score: 4
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
## 2023-10-24 NOTE — ED PROVIDER NOTES
"  History     Chief Complaint:  Suicidal and Ingestion       The history is provided by the patient and the EMS personnel.      Marcus Olguin is a 28 year old male who presents via EMS with PD for suicidal ideation and ingestion. Per PD, EMS was called by the patient's parents because he was threatening to kill himself. PD says that he was threatening to kill himself and held a screwdriver to his neck.  He was asking that please officera shoot and kill him.      Per patient, he took 8-9 Tylenol 45 minutes prior to arrival for \"no reason.\" He denies any suicidal ideations and says that he is unsure why his family called EMS. Patient says that he \"feels fine\" and denies any physical symtoms. No use of other drugs or alcohol. Patient denies a history of attempting suicide. Denies history of liver or kidney problems.       Independent Historian:    EMS reports the history as stated above.     Review of External Notes:  None    Medications:    Patient says that he does not take any daily medications.     Past Medical History:    Patient denies any pertinent past medical history.     Physical Exam   Patient Vitals for the past 24 hrs:   BP Temp Temp src Pulse Resp SpO2   10/24/23 1627 (!) 139/104 -- -- 94 -- 99 %   10/24/23 1600 (!) 156/105 99  F (37.2  C) Oral 95 16 100 %        Physical Exam    HEENT:    Oropharynx is moist  Eyes:    Conjunctiva normal  Neck:     Supple, no meningismus.     CV:     Regular rate and rhythm.      No murmurs, rubs or gallops.    PULM:    Clear to auscultation bilateral.       No respiratory distress.      Good air exchange.  ABD:    Soft, non-tender, non-distended.       No rebound, guarding or rigidity.  MSK:     No gross deformity to all four extremities.   LYMPH:   No cervical lymphadenopathy.  NEURO:   Alert and oriented x 3.      Speech is clear with no aphasia.     Gait stable     Normal muscular tone, no tremor.  Skin:    Warm, dry and intact.    Psych:    Intermittently agitated "      Visibly angry     No delusions, hallucinations.     Memory intact.      Emergency Department Course   ECG  ECG results from 10/24/23   EKG 12-lead, tracing only     Value    Systolic Blood Pressure     Diastolic Blood Pressure     Ventricular Rate 80    Atrial Rate 80    RI Interval 128    QRS Duration 92        QTc 419    P Axis 22    R AXIS -46    T Axis 36    Interpretation ECG      Sinus rhythm  Left axis deviation  Abnormal ECG  When compared with ECG of 11-JAN-2022 08:15,  No significant change was found          Laboratory:  Labs Ordered and Resulted from Time of ED Arrival to Time of ED Departure   COMPREHENSIVE METABOLIC PANEL - Abnormal       Result Value    Sodium 140      Potassium 3.8      Carbon Dioxide (CO2) 25      Anion Gap 14      Urea Nitrogen 10.0      Creatinine 1.37 (*)     GFR Estimate 73      Calcium 10.3 (*)     Chloride 101      Glucose 84      Alkaline Phosphatase 65      AST 29      ALT 22      Protein Total 8.5 (*)     Albumin 5.2      Bilirubin Total 1.7 (*)    ACETAMINOPHEN LEVEL - Abnormal    Acetaminophen 48.8 (*)    ETHYL ALCOHOL LEVEL - Normal    Alcohol ethyl <0.01     SALICYLATE LEVEL - Normal    Salicylate <0.3     ACETAMINOPHEN LEVEL - Normal    Acetaminophen 29.5     CBC WITH PLATELETS AND DIFFERENTIAL    WBC Count 5.6      RBC Count 5.48      Hemoglobin 16.7      Hematocrit 50.1      MCV 91      MCH 30.5      MCHC 33.3      RDW 13.1      Platelet Count 275      % Neutrophils 64      % Lymphocytes 24      % Monocytes 10      % Eosinophils 2      % Basophils 0      % Immature Granulocytes 0      NRBCs per 100 WBC 0      Absolute Neutrophils 3.5      Absolute Lymphocytes 1.4      Absolute Monocytes 0.6      Absolute Eosinophils 0.1      Absolute Basophils 0.0      Absolute Immature Granulocytes 0.0      Absolute NRBCs 0.0     COVID-19 VIRUS (CORONAVIRUS) BY PCR        Emergency Department Course & Assessments:     Interventions:  Medications   ibuprofen (ADVIL/MOTRIN)  tablet 600 mg (has no administration in time range)   LORazepam (ATIVAN) tablet 1 mg (has no administration in time range)   OLANZapine zydis (zyPREXA) ODT tab 10 mg (has no administration in time range)   melatonin tablet 3 mg (has no administration in time range)   hydrOXYzine (ATARAX) tablet 25 mg (has no administration in time range)        Assessments:   I obtained history and examined the patient as noted above.    I rechecked and updated the patient.   I rechecked on the patient and he was put in restraints.   Within an hour after restraint an in person face to face assessment was completed at , including an evaluation of the patient's immediate reaction to the intervention, behavioral assessment and review/assessment of history, drugs and medications, recent labs, etc., and behavioral condition.  The patient experienced: No adverse physical outcome from seclusion/restraint initiation.  The intervention of restraint or seclusion needs to continue.     Independent Interpretation (X-rays, CTs, rhythm strip):  None    Consultations/Discussion of Management or Tests:   I consulted with DEC .        Social Determinants of Health affecting care:  None      Disposition:  The patient was transferred to my colleague Dr. Jacob pending inpatient psychiatric treatment.    Impression & Plan    Medical Decision Makin old male presented to the ED with an intentional Tylenol overdose in which he reported that this was a suicide attempt to EMS.  His initial significant level was elevated but within prior to the 4-hour draw.  4-hour level is with his not in the toxic range thus no indication for NAC.  The remainder of his toxicology work-up was reassuring.    Patient was placed on LONI hold due to reports of intentional drug ingestion and suicidal comments made in the field.  Patient was not initially forthcoming with his history.  He is under significant social stressors including  potentially losing his place of residence, family member are no longer in the state and currently residing in [place].  He is very frustrated that he has to remain in the ED and believes it is a disservice to his well-being.  I empathize with his current situation but patient made significant efforts to end his life today by intentional drug overdose, holding a screwdriver to his neck and asking police to shoot him.      Patient was evaluated by DEC who agrees with my concern that he is unfit to be discharged home and is at high risk for suicide completion.  Patient was visibly frustrated by plans of patient remaining in the ED on LONI and transferring to inpatient psychiatric facility but I believe it is best interest due to high risk of suicide completion.  Patient is pending inpatient psychiatric placement and will be changed to oncoming ED provider.    Diagnosis:    ICD-10-CM    1. Intentional drug overdose, initial encounter (H)  T50.902A       2. Suicidal ideation  R45.851       3. Elevated blood pressure reading without diagnosis of hypertension  R03.0              Scribe Disclosure:  I, Lorie Kaplan, am serving as a scribe at 4:11 PM on 10/24/2023 to document services personally performed by Damon Kidd MD based on my observations and the provider's statements to me.    10/24/2023   Damon Kidd MD Matthews, Jeremiah R, MD  10/24/23 1086
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
## 2023-10-24 NOTE — ED NOTES
"Patient eventually agreed to blood draw and EKG. Patient searched by security prior to transferring to room 11. Belongings bag in DEC office. LONI information given to patient. Patient verbalized frustration with LONI, asked RN, \"how long do I have to stay here?\" Updated patient on LONI information. Patient's sister Delmi called asking for an update. Patient does not give consent to speak to sister. Patient states, \"I don't want no one getting any information about me.\" Offered patient food and water, patient refuses at this time. 1:1 sitter remains at bedside.   "

## 2023-10-24 NOTE — ED NOTES
Bed: [facility]A  Expected date:   Expected time:   Means of arrival:   Comments:  BV2-A hallway

## 2023-10-25 ENCOUNTER — TELEPHONE (OUTPATIENT)
Dept: BEHAVIORAL HEALTH | Facility: CLINIC | Age: 28
End: 2023-10-25

## 2023-10-25 LAB
AMPHETAMINES UR QL SCN: ABNORMAL
ATRIAL RATE - MUSE: 80 BPM
BARBITURATES UR QL SCN: ABNORMAL
BENZODIAZ UR QL SCN: ABNORMAL
BZE UR QL SCN: ABNORMAL
CANNABINOIDS UR QL SCN: ABNORMAL
DIASTOLIC BLOOD PRESSURE - MUSE: NORMAL MMHG
FENTANYL UR QL: ABNORMAL
INTERPRETATION ECG - MUSE: NORMAL
OPIATES UR QL SCN: ABNORMAL
P AXIS - MUSE: 22 DEGREES
PCP QUAL URINE (ROCHE): ABNORMAL
PR INTERVAL - MUSE: 128 MS
QRS DURATION - MUSE: 92 MS
QT - MUSE: 364 MS
QTC - MUSE: 419 MS
R AXIS - MUSE: -46 DEGREES
SARS-COV-2 RNA RESP QL NAA+PROBE: NEGATIVE
SYSTOLIC BLOOD PRESSURE - MUSE: NORMAL MMHG
T AXIS - MUSE: 36 DEGREES
VENTRICULAR RATE- MUSE: 80 BPM

## 2023-10-25 PROCEDURE — 250N000013 HC RX MED GY IP 250 OP 250 PS 637: Performed by: EMERGENCY MEDICINE

## 2023-10-25 PROCEDURE — 87635 SARS-COV-2 COVID-19 AMP PRB: CPT | Performed by: EMERGENCY MEDICINE

## 2023-10-25 PROCEDURE — 80307 DRUG TEST PRSMV CHEM ANLYZR: CPT | Performed by: EMERGENCY MEDICINE

## 2023-10-25 RX ADMIN — Medication 3 MG: at 23:24

## 2023-10-25 ASSESSMENT — ACTIVITIES OF DAILY LIVING (ADL)
ADLS_ACUITY_SCORE: 35

## 2023-10-25 NOTE — PROGRESS NOTES
"Triage & Transition Services, Extended Care     Therapy Progress Note    Patient: Marcus goes by \"Marcus,\" uses he/him pronouns  Date of Service: October 25, 2023  Site of Service: [facility] ED08  Patient was seen in-person.     Presenting problem:   Marcus is followed related to  awaiting IP MH admission . Please see initial DEC/LM Crisis Assessment completed by Siobhan Lopez on 10/24/23 for complete assessment information. Notable concerns include: Verbal agitation, Suicidal ideation, Suicide attempt, Worsening psychosocial stress. Per initial consult \"Pt was brought to the ED via PD and EMS due to taking 8-9 acetaminophen and calling his family out of state. They called 911 and Pt was threatening to cut his neck with a screwdriver\".     Individuals Present: Marcus & MARIJA Stevens    Session start: 10:35am  Session end: 11:20am  Session duration in minutes: 45 minutes   Session number: 1  Anticipated number of sessions or this episode of care: 1-4  CPT utilized: 24296 - Psychotherapy (with patient) - 45 (38-52*) min    Current Presentation:   Patient was sitting up speaking with the sitter.  Writer entered patient's room, introduced self and explained role.  Patient was awake and alert, stated he has been talking with various people, notes some improvement in mood today.  Patient noted he's been under a lot of stress, saying \"I let my emotions eat me up, I felt like everybody up and left me\".  Patient spoke about his break up with his fiance, she moved to [place] with her sister about a year ago, this was very distressing for him, he was in the ED at that time with SI, patient is tearful as he stated this is still hard for him, he is lonely and misses her.  Patient said since then they have reunited and talk everyday on the phone, he is hoping she will move back with him, though states financially he would have to pay for flight and to move her belongings, which he is unable to do at this time.  " "Patient mentioned his sister moved to [place] with his nephews, he feels like he doesn't have enough supports around to help him through current stressors.  Patient said main stressor is related to finances, patient is facing eviction and needs to pay $5300, he said he felt like he was giving up, though now identified he would like to take out a personal loan to pay off the debt, and reported moving forward he should have a job at "[employer] saying \"I've already done everything, I'm just waiting on the date for orientation\". Patient acknowledged the overdose saying he took a handful of pills, he said \"there must be a reason I'm still here\"  patient was often tearful throughout encounter, though noted some improvement and said  he is now open to some type of follow up with outpatient therapy, patient also agreed to psychiatry consult for tomorrow.  Patient is willing to continue to board in the ED and is voluntary for treatment. Patient is unable to identify supports outside of the hospital, he does not have insurance or established providers for follow up, unable to engage in meaningful safety planning, patient lives alone and would returning to same stressors.       Mental Status Exam:   Appearance: awake, alert  Attitude: more cooperative  Eye Contact: good  Mood: sad , better, and tearful  Affect: mood congruent  Speech: clear, coherent  Psychomotor Behavior: fidgeting  Thought Process:  goal oriented  Associations: no loose associations  Thought Content: passive suicidal ideation present  Insight: fair  Judgement: fair  Oriented to: time, person, and place  Attention Span and Concentration: intact  Recent and Remote Memory: intact    Diagnosis:   Adjustment disorder with mixed anxiety and depressed mood F43.23       Therapeutic Intervention(s):   Provided active listening, unconditional positive regard, and validation. Engaged in cognitive restructuring/ reframing, looked at common cognitive distortions and " challenged negative thoughts. Taught the link between thoughts, feelings, and behaviors. Provided positive reinforcement for progress towards goals, gains in knowledge, and application of skills previously taught.     Treatment Objective(s) Addressed:   The focus of this session was on rapport building, safety planning, assessing safety, identifying additional supports, and exploring obstacles to safety in the community.     Progress Towards Goals:   Patient reports improving symptoms. Patient is making progress towards treatment goals as evidenced by calm and cooperative behavior in therapeutic interaction. .     Case Management:   Writer gave patient information and phone number for housing assistance in [facility]     General Recommendations:   Continue to monitor for harm. Consider: Use a positive, direct and calm approach. Pt's tend to match the energy/mood of the staff. Keep focus positive and upbeat, Use clear and concise directions, too many words can be overwhelming, Provide the pt with options to provide a sense of control. Try to tell the pt what they can do instead of what they can't do, Allow family calls/visits, Verbally state expectations , and Listen in a neutral, non-judgmental way. Offer reassurance    Plan:   Inpatient Mental Health: It is the recommendation of this clinician that pt admit to IP MH for safety and stabilization. Pt displays the following risk factors that support IP admission: suicide attempt. Pt is unable to engage in safety planning to mitigate risk level in a non-secure setting. Lower levels of care have not been successful in mitigating risk. Due to this IP is the least restrictive option of care for pt. Pt should remain in IP until deemed safe to return to the community and engage in OP MH supports. Pt will need assistance establishing OP MH services prior to discharge.     Plan for Care reviewed with Assigned Medical Provider? Yes. Provider, Edilia MCELROY, response: understands  plan of care     MARIJA Stevens   Licensed Mental Health Professional (LMHP), [facility]  [phone]

## 2023-10-25 NOTE — ED NOTES
Patient escalating, requesting to have door closed, explained to patient that writer can't when he has the phone cord and call light cord in room, patient ripped phone and call light out and gave to writer, then aggressively closed door on MD josette at room, room cleared for safety, and patient placed in seclusion per MD, food and blankets provided, security updated

## 2023-10-25 NOTE — ED NOTES
Pt resting with sitter in room. Breathing even, nonlabored. Pt requesting to be reevluated by DEC. Message sent to care team

## 2023-10-25 NOTE — PHARMACY-ADMISSION MEDICATION HISTORY
Pharmacist Admission Medication History    Admission medication history is complete. The information provided in this note is only as accurate as the sources available at the time of the update.    Information Source(s): Patient and CareEverywhere/SureScripts via N/A  Patient denied any medications to DEC . Appears to not be prescribed any medications via chart review.   Did not attempt to interview patient as he was quite agitated earlier and is now sleeping. Did not want to wake pt up.     Pertinent Information: none    Changes made to PTA medication list:  Added: None  Deleted: None  Changed: None    Medication Affordability:  Not including over the counter (OTC) medications, was there a time in the past 3 months when you did not take your medications as prescribed because of cost?: Unable to Assess    Allergies reviewed with patient and updates made in EHR: no    Medication History Completed By:   So Tillman, PharmD, [facility]   Emergency Medicine Pharmacist  [phone] or Rachel  October 24, 2023    Prior to Admission medications    Not on File

## 2023-10-25 NOTE — ED NOTES
"Marcus Olguin  October 24, 2023  Plan of Care Hand-off Note     Patient Care Path: inpatient mental health    Plan for Care:   It is the recommendation of this clinician that pt admit to IP MH for safety and stabilization. Pt displays the following risk factors that support IP admission: suicide attempt. Pt is unable to engage in safety planning to mitigate risk level in a non-secure setting. Lower levels of care have not been successful in mitigating risk. Due to this IP is the least restrictive option of care for pt. Pt should remain in IP until deemed safe to return to the community and engage in OP MH supports. Pt will need assistance establishing OP MH services prior to discharge.        Identified Goals and Safety Issues: Pt placed in seclusion 10/24 2000.    Overview:  Pt was brought to the ED via PD and EMS due to taking 8-9 acetaminophen and calling his family out of state. They called 911 and Pt was threatening to cut his neck with a screwdriver. Pt was last seen in the ED on 10/7/23 for SI. Pt got in a domestic dispute with his girlfriend when he found her cheating and made SI comments towards PD so they brought him to the ED for a psych evaluation. Pt repeatedly reported not having any supports in [place] and feeling lonely. Pt expressed facing an eviction soon and awaiting a background check for a job offer. Pt stated his family and girlfriend recently moved out of state (Pt's sister reported they are broken up), Pt says their relationship is \"fine\". When asked about what happened today, Pt stated \"it was a fucked moment but I am fine now\". Pt stated no one cares about him and nobody would have known if he did not call them. Pt expressed being a \"grown man who has shit to take care of, I need to go home\". Writer reminded Pt taking care of himself is also important. Writer inquired about his intention of taking the acetaminophen, Pt expressed \" I tried to take the easy way out, the cowardly way out\". " "Pt quickly followed this with \"but I am fine now\". Writer inquired about any outpatient mental health treatment, Pt is adamant that therapy would be a waste of time and he does not need any mental health help or trust anyone. Pt was very tearful and distressed during assessment, avoiding eye contact with writer. Pt stated that Writer will not even rememeber his name or face. Writer informed Pt they were concerned about him and thinks he needs support. Pt denied and stated he wants to leave. Pt expressed worry about not having insurance, Writer connected with MD about registration helping apply for insurance.           Legal Status: Legal Status at Admission:  LONI, awaiting 72HH to be placed by MD       Psychiatry Consult: Pt not interested in medications.       Updated regarding plan of care.  MD Siobhan Cantu MA        "

## 2023-10-25 NOTE — TELEPHONE ENCOUNTER
R:  Intake called [facility] ED to request a COVID for outside bed search @ 2:30pm      No beds available within [facility].      Pt will remain on adult worklist

## 2023-10-25 NOTE — ED NOTES
"Introduced self to patient, patient pacing, apologized to patient for situation, \"What are you sorry for? I should have just finished the job\", patient requested phone and remote for TV provided, Sitter at bedside  "

## 2023-10-25 NOTE — ED NOTES
RN ED Mental Health Handoff Note    72 hour hold    Does patient require 1:1? Yes    Hold and rights been given and documented for patient: No    Is the patient in BH scrubs? No -street clothes    Has the patient been searched? Yes    Is the 15 minute observation tool up to date? Yes    Was patient issued a welcome folder? No -patient was not agreeable at the time, maybe today    Room check completed this shift: Yes    PSS3 and [place] Assessment/Reassessment this shift:    C-SSRS ([place])      Date and Time Q1 Wished to be Dead (Past Month) Q2 Suicidal Thoughts (Past Month) Q3 Suicidal Thought Method Q4 Suicidal Intent without Specific Plan Q5 Suicide Intent with Specific Plan Q6 Suicide Behavior (Lifetime) Within the Past 3 Months? RETIRED: Level of Risk per Screen Screening Not Complete User   10/24/23 1730 yes yes yes no yes -- -- -- -- JF   10/24/23 1729 -- -- -- -- -- yes -- -- -- JF            Behavioral status of patient: Yellow. At the start of his stay, rested all night, has great potential to become agitated    Code 21 called this shift? No    Use of restraints/seclusion this shift? Yes. Details: patient in seclusion briefly    Most recent vital signs:  Temp: 99  F (37.2  C) Temp src: Oral BP: (!) 139/104 Pulse: 94   Resp: 16 SpO2: 99 % O2 Device: None (Room air)      Medications:  Scheduled medication compliance? No    PRN Meds administered this shift? No    Medications   ibuprofen (ADVIL/MOTRIN) tablet 600 mg (has no administration in time range)   LORazepam (ATIVAN) tablet 1 mg (has no administration in time range)   OLANZapine zydis (zyPREXA) ODT tab 10 mg (has no administration in time range)   melatonin tablet 3 mg (has no administration in time range)   hydrOXYzine (ATARAX) tablet 25 mg (has no administration in time range)         ADLs    Meal Provided this shift? Yes    Hygiene items provided? No    ADLs completed? No    Date of last shower: PTA    Any significant events this shift? Yes.  "Details: admission to ED    Any information that would be helpful in caring for this patient?  He has made statements to writer that \"he should have just finished the job\"    Family present/updated? No    Location of patient's belongings: DEC office    Critical Care Minutes:  Does the patient need critical care minutes documented? Yes                   "

## 2023-10-25 NOTE — ED NOTES
IP MH Referral Acuity Rating Score (RARS)     LMHP complete at referral to IP MH, with DEC; and, daily while awaiting IP MH placement. Call score to PPS.  CRITERIA SCORING   New 72 HH and Involuntary for IP MH (not adolescent) 1/1   Boarding over 24 hours 1/1   Vulnerable adult at least 55+ with multiple co morbidities; or, Patient age 11 or under 0/1   Suicide ideation without relief of precipitating factors 1/1   Current plan for suicide 0/1   Current plan for homicide 0/1   Imminent risk or actual attempt to seriously harm another without relief of factors precipitating the attempt 0/1   Severe dysfunction in daily living (ex: complete neglect for self care, extreme disruption in vegetative function, extreme deterioration in social interactions) 1/1   Recent (last 2 weeks) or current physical aggression in the ED 0/1   Restraints or seclusion episode in ED 0/1   Verbal aggression, agitation, yelling, etc., while in the ED 1/1   Active psychosis with psychomotor agitation or catatonia 0/1   Need for constant or near constant redirection (from leaving, from others, etc).  0/1   Intrusive or disruptive behaviors 0/1   TOTAL Acuity Total Score: 4

## 2023-10-25 NOTE — TELEPHONE ENCOUNTER
R: [place]  Access Inpatient Bed Call Log 10/25/23 @ 1:00am   Intake has called facilities that have not updated their bed status within the last 12 hours.??      *[place]:  [place] -- [facility]: @ cap per website.  [place] -- [facility]:  @ cap per website.  [place] -- [facility]: @ cap per website.  [place] -- [facility]: TEMPORARILY CLOSED DUE TO COVID EXPOSURE.  [place] -- [facility]: @ cap per website.  [facility] -- [facility]: @ cap per website.  [facility]/ beds - POSTING 1 BED. Ages 18-25, Voluntary only, COVID test req'd, NO aggression, physical or sexual assault, violence hx or drug abuse  [place] -- [facility]: @ cap per website.  [place] -- [facility]: @ cap per website.  [place] -- [facility]: @ cap per website.     *STATEWIDE (by distance):  [facility]: POSTING 2 BEDS. Mixed unit/Low acuity only.   [facility]: @ cap per website. Low acuity, No aggression  [facility]: @ cap per website.   [facility]: @ cap per website. Low acuity only. No current aggression.  [facility]:  @ cap per website. COVID negative test req. Lower acuity only  [facility]: POSTING 2 BEDS. Low acuity only. Prefer med adjustments placement.  [facility]: @ cap per website.  No aggression  [place] -- [facility]: @ cap per website. NO reviews after 10PM. Low acuity only.  [place] -- [facility]: POSTING 4 BEDS. No hx of aggression. No sexual offenders. Voluntary patients only.  [place] -- [facility]: POSTING 7 BEDS. Low acuity only. Must have the cognitive ability to do programming. No aggressive or violent behavior or recent HX in the last 2 yrs. MH must be primary.   [place] [facility]: [facility]ING 2 [facility]S.  COVID negative test. Must be low acuity ONLY.  [place] -- [facility]: POSTING 2 BEDS. Low acuity. Negative Covid.   [place] -- [facility]: POSTING 4 BEDS. Covid neg. Voluntary only.  Mixed unit of adults & adol. No aggressive or violent behavior. No registered sex offenders.   [place] -- [facility]: @ cap per website. COVID negative test. PER RN, FULL FOR HIGH ACUITY.  [facility]: @ cap per website. No hx of aggression/assault. No lines, drains or tubes. Does not provide detox or CD treatment.   [place] -- [facility]: POSTING 3 BEDS. Mixed unit/Low acuity/no medical devices - IV, CPAP etc.      Pt remains on waitlist pending appropriate placement availability

## 2023-10-26 ENCOUNTER — TELEPHONE (OUTPATIENT)
Dept: BEHAVIORAL HEALTH | Facility: CLINIC | Age: 28
End: 2023-10-26

## 2023-10-26 VITALS
HEART RATE: 60 BPM | RESPIRATION RATE: 16 BRPM | OXYGEN SATURATION: 100 % | TEMPERATURE: 99 F | DIASTOLIC BLOOD PRESSURE: 90 MMHG | SYSTOLIC BLOOD PRESSURE: 140 MMHG

## 2023-10-26 PROCEDURE — 99203 OFFICE O/P NEW LOW 30 MIN: CPT | Mod: 95 | Performed by: PSYCHIATRY & NEUROLOGY

## 2023-10-26 ASSESSMENT — COLUMBIA-SUICIDE SEVERITY RATING SCALE - C-SSRS
TOTAL  NUMBER OF ABORTED OR SELF INTERRUPTED ATTEMPTS SINCE LAST CONTACT: NO
6. HAVE YOU EVER DONE ANYTHING, STARTED TO DO ANYTHING, OR PREPARED TO DO ANYTHING TO END YOUR LIFE?: NO
TOTAL  NUMBER OF INTERRUPTED ATTEMPTS SINCE LAST CONTACT: NO
ATTEMPT SINCE LAST CONTACT: NO
1. SINCE LAST CONTACT, HAVE YOU WISHED YOU WERE DEAD OR WISHED YOU COULD GO TO SLEEP AND NOT WAKE UP?: NO
SUICIDE, SINCE LAST CONTACT: NO
2. HAVE YOU ACTUALLY HAD ANY THOUGHTS OF KILLING YOURSELF?: NO

## 2023-10-26 ASSESSMENT — ACTIVITIES OF DAILY LIVING (ADL)
ADLS_ACUITY_SCORE: 35

## 2023-10-26 NOTE — ED NOTES
Pt making repeated requests to speak with writer. Writer explained that Celi will be meeting with and assessing later this morning. Pt accepting of answer. 1:1 at bedside. Pt agreeable to VS.

## 2023-10-26 NOTE — PROGRESS NOTES
"  Triage & Transition Services, Extended Care      Therapy Progress Note     Patient: Marcus goes by \"Marcus,\" uses he/him pronouns  Date of Service: October 25, 2023  Site of Service: [facility] ED08  Patient was seen in-person.      Presenting problem:   Marcus is followed related to  awaiting IP MH admission . Please see initial DEC/LM Crisis Assessment completed by Siobhan Lopez on 10/24/23 for complete assessment information. Notable concerns include: Verbal agitation, Suicidal ideation, Suicide attempt, Worsening psychosocial stress. Per initial consult \"Pt was brought to the ED via PD and EMS due to taking 8-9 acetaminophen and calling his family out of state. They called 911 and Pt was threatening to cut his neck with a screwdriver\".      Individuals Present: Marcus & MARIJA Stevens    Session start: 10:35am  Session end: 11:20am  Session duration in minutes: 45 minutes   Session number: 1  Anticipated number of sessions or this episode of care: 1-4  CPT utilized: 24712 - Psychotherapy (with patient) - 45 (38-52*) min     Current Presentation:   Patient was sitting up speaking with the sitter.  Writer entered patient's room, introduced self and explained role.  Patient was awake and alert, stated he has been talking with various people, notes continued improvement today.  He states he feels well rested, would like to discharge home today.  Patient states he will continue with his plans to take out a personal loan to cover eviction, he looks forward to starting a job at [employer].  Patient spoke about supports with his mother, sister, and girlfriend.  Patient is future oriented, he spoke about plans he and his girlfriend have and starting the new job.  Patient is willing to participate in outpatient treatment, he is open to following up with therapy and psychiatry.  Patient participated in safety assessment, he denies SI- denies having thoughts of a plan, intent, or means.  Patient denies HI, NSSI, he " denies AH/VH.  Patient appropriately engaged in safety and discharge planning.     [place]son Suicide Severity Rating Scale Since Last Contact: 10.26.23  Suicidal Ideation (Since Last Contact)  1. Wish to be Dead (Since Last Contact): No  2. Non-Specific Active Suicidal Thoughts (Since Last Contact): No  Suicidal Behavior (Since Last Contact)  Actual Attempt (Since Last Contact): No  Has subject engaged in non-suicidal self-injurious behavior? (Since Last Contact): No  Interrupted Attempts (Since Last Contact): No  Aborted or Self-Interrupted Attempt (Since Last Contact): No  Preparatory Acts or Behavior (Since Last Contact): No  Suicide (Since Last Contact): No     C-SSRS Risk (Since Last Contact)  Calculated C-SSRS Risk Score (Since Last Contact): No Risk Indicated                     Mental Status Exam:   Appearance: awake, alert  Attitude: cooperative  Eye Contact: good  Mood: better, engaged   Affect: mood congruent  Speech: clear, coherent  Psychomotor Behavior: fidgeting  Thought Process:  goal oriented  Associations: no loose associations  Thought Content: no suicidal ideation present  Insight: good  Judgement: good  Oriented to: time, person, and place  Attention Span and Concentration: intact  Recent and Remote Memory: intact     Diagnosis:   Adjustment disorder with mixed anxiety and depressed mood F43.23         Therapeutic Intervention(s):   Provided active listening, unconditional positive regard, and validation. Engaged in cognitive restructuring/ reframing, looked at common cognitive distortions and challenged negative thoughts. Taught the link between thoughts, feelings, and behaviors. Provided positive reinforcement for progress towards goals, gains in knowledge, and application of skills previously taught.      Treatment Objective(s) Addressed:   The focus of this session was on rapport building, safety planning, assessing safety, identifying additional supports, and exploring obstacles to safety in  the community.      Progress Towards Goals:   Patient reports improving symptoms. Patient is making progress towards treatment goals as evidenced by calm and engaging behavior in therapeutic interaction.      Case Management:   Writer gave patient information and phone number for housing assistance in [facility]         Plan:   Discharge: Patient participated in both reassessment and psychiatric evaluation.  Patient participated in safety assessment, he denies SI- denies having thoughts of a plan, intent, or means.  Patient denies HI, NSSI, he denies AH/VH.  Patient is future oriented and willing to access follow up care with outpatient therapy and psychiatry.  Patient identifies supports with his mother, sister, and spouse.  He is motivated and has a plan to address current stressors.     Patient participated in assessment and psychiatric consult.  Patient denies SI/HI/NSSI today.  Patient appropriately engaged in safety and discharge planning.  Upon reassessment, the patient's acute suicide risk was determined to be low due to the following factors: reduction in the intensity of mood/anxiety symptoms that preceded the admission, denial of suicidal thoughts, denies feeling helpless or hopeless, not currently under the influence of alcohol or illicit substances, denies experiencing command hallucinations and no immediate access to firearms. Protective factors include: social support, voluntarily seeking mental health support, displays resiliency, future focused thinking, displays insight, expresses desire to engage in treatment, sense of obligation to people and safe/stable housing.      Plan for Care reviewed with Assigned Medical Provider? Yes. Provider, Dr. Dietrich, RNRadha Adame response: understands plan of care                MARIJA Stevens   Licensed Mental Health Professional (LMHP), [facility]  [phone]    Aftercare Plan     Follow up with established providers and supports as scheduled.  Continue taking medications as prescribed. Abstain from drugs and alcohol. Utilize your [facility] mental health crisis team as needed. They are available 24/7. Contact information is listed below.      Appointments:      Patient prefers to follow up with established providers:    [facility];    Address  [address] [place] [address]    Hours  Monday through Friday, 8 a.m. - 5:30 p.m.  Closed on Saturday, Sunday and holidays.    [facility] provides service to adults (ages 18 and over) in [facility] who are experiencing a mental health or chemical health crisis. Walk-in services include access to an onsite team of psychiatrists, nurses, social workers and trained peer support staff that provide person-centered, recovery-focused care.    [facility] offers an alternative to visiting the emergency room during a mental health crisis. You can expect excellent, professional service in a caring, supportive environment.    Phone support is available 24/7 and mobile crisis teams are available to meet you at your home, school, workplace or in the community.    If you are in crisis, please call [phone] for assistance. Calls are answered 24/7/365.    The following mental health services are available:  24/7 crisis phone support.  Mental health crisis assessment.  Access to crisis psychiatry as eligible.  Chemical health screening.  Peer support.  Crisis stabilization services (for residents of [place] and [facility]).  Family education and support.  Referral to community resources.    Cost  There is a fee for Urgent Care services. Services are billed to insurance providers when available. A variety of payment options are available including an income-based sliding fee scale.      If I am feeling unsafe or I am in a crisis, I will:   Contact my established care providers   Call the National Suicide Prevention Lifeline:  932.906.5178   Go to the nearest emergency room   Call 911      Warning signs that I or other people might notice when a crisis is developing for me: changes to sleep, appetite or mood, increased anger, agitation or irritability, feeling depressed or hopeless, spending more time alone or talking less, increased crying, decreased productivity, seeing or hearing things that aren't there, thoughts of not wanting to live anymore or of actually killing myself, thoughts of hurting others     Things I am able to do on my own to cope or help me feel better: watching a favorite tv show or movie, listening to music I enjoy, going outside and breathing fresh air, going for a walk or exercising, taking a shower or bath, a cold or hot beverage, a healthy snack, drawing/coloring/painting, journaling, singing or dancing, deep breathing      I can try practicing square breathing when I begin to feel anxious - inhale through the nose for the count of 4 and the first line on the square. Exhale through the mouth for the count of 4 for the second line of the square. Repeat to complete the square. Repeat the square as many times as needed.     I can also use my five senses to practice mindfulness and grounding. What are five things I can see, four things I can hear, three things I can feel, two things I can smell, and one thing I can taste.      Things that I am able to do with others to cope or help me feel better: sometimes just talking or spending time with someone else, sharing a meal or having coffee, watching a movie or playing a game, going for a walk or exercising     I can also use community resources including mental health hotlines, [facility] crisis teams, or apps.      Things I can use or do for distraction: movies/tv, music, reading, games, drawing/coloring/painting or other art, essential oils, exercise, cleaning/organizing, puzzles, crossword puzzles, word search, Sudoku       I can also download a meditation or relaxation  "andrew, like Calm, Headspace, or Insight Timer (all three offer a free version)     A great website resource is Change to Chill with active coping skills     Changes I can make to support my mental health and wellness: Attend scheduled mental health appointments. Take my medications as prescribed. Maintain a daily schedule/routine. Abstain from all mood altering substances, including drugs, alcohol, or medications not currently prescribed to me. Implement a self-care routine.       People in my life that I can ask for help: my mom, my girlfriend, my sister, walk-in counseling,  urgent care        Your [facility] has a mental health crisis team you can call 24/7: [facility] Crisis Line Number: [phone]     Other things that are important when I m in crisis: remember help is available, return to ED if symptoms persist         Crisis Lines  Crisis Text Line  Text 618418  You will be connected with a trained live crisis counselor to provide support.     Por espanol, texto  MIHAELA a 452618 o texto a 442-AYUDAME en WhatsApp     National Hope Line  1.800.SUICIDE [0175874]        Community Resources  Fast Tracker  Linking people to mental health and substance use disorder resources  fasttrackermn.org      [place] Mental Health Warm Line  Peer to peer support  Monday thru Saturday, 12 pm to 10 pm  861.861.8193 or 7.973.709.1748  Text \"Support\" to 91913     National [place] on Mental Illness (GLORIA)  799.404.2805 or 1.888.GLORIA.HELPS        Mental Health Apps  My3  https://my3app.org/     VirtualHopeBox  https://Exabrede.org/apps/virtual-hope-box/        Additional Information  Today you were seen by a licensed mental health professional through Triage and Transition services, [facility] ([facility])  for a crisis assessment in the Emergency Department at [facility].  It is recommended that you follow up with your established providers (psychiatrist, mental health therapist, and/or " primary care doctor - as relevant) as soon as possible. Coordinators from [facility] will be calling you in the next 24-48 hours to ensure that you have the resources you need.  You can also contact [facility] coordinators directly at [phone]. You may have been scheduled for or offered an appointment with a mental health provider. [facility] maintains an extensive network of licensed behavioral health providers to connect patients with the services they need.  We do not charge providers a fee to participate in our referral network.  We match patients with providers based on a patient's specific needs, insurance coverage, and location.  Our first effort will be to refer you to a provider within your care system, and will utilize providers outside your care system as needed.
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
## 2023-10-26 NOTE — CONSULTS
Mental Health Transition and Triage-Extended Care  Civil Commitment Status Plan of Care    Current commitment status is: under 72 Hour Hold expiring 10/30/23 12:35 PM    Writer did not meet with Marcus Olguin due to:C&L Psych provider and [facility] already met with pt and re-assessed for disposition.    A chart review was completed due to 72HH.    Collateral was not obtained in addition to the DEC consult completed on 10/25/23 by [facility] Celi Perry.      Marcus Olguin does not currently meet criteria for civil commitment as evidence of : Future oriented, have a safe place to discharge to, and willing to safety plan with outpatient psychotherapy follow-up.    Writer consulted with C&L psych provider Dr. West and [facility] Celi Perry regarding this patient and they are in agreement that at this time Marcus Olguin does not meet criteria for civil commitment. Please see their notes for more information.    Recommendations/Plan:  Discontinuation of 72 hour hold.  Extended Care [facility] to continue to follow for support for disposition.  Request review of commitment needs if matters change regarding this case by calling Extended Care at [phone].    LATISHA Brooke, LICSW, Psychotherapist  Extended Care- Triage & Transition Services  Callback: [phone]

## 2023-10-26 NOTE — DISCHARGE INSTRUCTIONS
Resources:    Free Counseling Services  COUNSELING SERVICES ARE COMPLETELY FREE AND ANONYMOUS. [facility] is a non-profit, non-[employer] organization, All of our professional counselors volunteer their time. Counseling is for individuals, couples, or families. No appointment (during clinics) or insurance is needed.    CLINIC HOURS: Please come/call/login only during clinic hours.  M, W, F:  1:00p - 3:00p for both in-person and virtual (phone and login) services    M, T, W, Th: 5:30p - 7:30p virtual services only    IN PERSON SERVICES: Come to [address] [place] [address] on Monday, Wednesday or Friday from 1 - 3 pm.    BY PHONE: Call Zoom at [phone]. When prompted, enter the meeting ID: [phone].    BY COMPUTER: Go to [email or web address]    ONE TAP MOBILE ON Screenie: To attend a clinic using the smart cell phone  one-tap mobile  button, click on this link and press the dial button on your phone:  [phone]  When Zoom answers, it may ask for a meeting id (you won t have one), so just wait until it asks you to simply press the # (number) button.    If the phone line is busy, try the phone numbers below. Try each phone number. Or, use landline phone-in instructions below.  [phone]  [phone]  [phone]  [phone]  [phone]    Services for [place] Speakers  Services for [place] speakers remain the same.  The client can simply call our main number ([phone]), dial extension 2 and leave a message with the call-back phone number.    Counseling by Appointment  If you are interested in ongoing counseling, the first step is to see a counselor at a clinic. Ongoing counseling is arranged by request during the clinic.    Free Counseling Services  Counseling services are completely free and anonymous, with no appointment needed. SOME CLINICS ARE NOW IN PERSON! All of our professional counselors volunteer their time.    [facility]  " [phone] or [phone]  info@mentalhealthmn.org  [address], [address]  [facility] [address]  Call [phone]  Text \"Support\" to 43362    CRISIS:  Text or Call 969     Aftercare Plan     Follow up with established providers and supports as scheduled. Continue taking medications as prescribed. Abstain from drugs and alcohol. Utilize your [facility] mental health crisis team as needed. They are available 24/7. Contact information is listed below.      Appointments:      Patient prefers to follow up with established providers:    [facility];    Address  [address] [place] [address]    Hours  Monday through Friday, 8 a.m. - 5:30 p.m.  Closed on Saturday, Sunday and holidays.    [facility] provides service to adults (ages 18 and over) in [facility] who are experiencing a mental health or chemical health crisis. Walk-in services include access to an onsite team of psychiatrists, nurses, social workers and trained peer support staff that provide person-centered, recovery-focused care.    [facility] offers an alternative to visiting the emergency room during a mental health crisis. You can expect excellent, professional service in a caring, supportive environment.    Phone support is available 24/7 and mobile crisis teams are available to meet you at your home, school, workplace or in the community.    If you are in crisis, please call [phone] for assistance. Calls are answered 24/7/365.    The following mental health services are available:  24/7 crisis phone support.  Mental health crisis assessment.  Access to crisis psychiatry as eligible.  Chemical health screening.  Peer support.  Crisis stabilization services (for residents of [place] and [facility]).  Family education and support.  Referral to community resources.    Cost  There is a fee for Urgent Care services. Services are billed to " insurance providers when available. A variety of payment options are available including an income-based sliding fee scale.      If I am feeling unsafe or I am in a crisis, I will:   Contact my established care providers   Call the [place] Suicide Prevention Lifeline: [phone]   Go to the nearest emergency room   Call 914      Warning signs that I or other people might notice when a crisis is developing for me: changes to sleep, appetite or mood, increased anger, agitation or irritability, feeling depressed or hopeless, spending more time alone or talking less, increased crying, decreased productivity, seeing or hearing things that aren't there, thoughts of not wanting to live anymore or of actually killing myself, thoughts of hurting others     Things I am able to do on my own to cope or help me feel better: watching a favorite tv show or movie, listening to music I enjoy, going outside and breathing fresh air, going for a walk or exercising, taking a shower or bath, a cold or hot beverage, a healthy snack, drawing/coloring/painting, journaling, singing or dancing, deep breathing      I can try practicing square breathing when I begin to feel anxious - inhale through the nose for the count of 4 and the first line on the square. Exhale through the mouth for the count of 4 for the second line of the square. Repeat to complete the square. Repeat the square as many times as needed.     I can also use my five senses to practice mindfulness and grounding. What are five things I can see, four things I can hear, three things I can feel, two things I can smell, and one thing I can taste.      Things that I am able to do with others to cope or help me feel better: sometimes just talking or spending time with someone else, sharing a meal or having coffee, watching a movie or playing a game, going for a walk or exercising     I can also use community resources including mental health hotlines, [facility] crisis teams, or  "apps.      Things I can use or do for distraction: movies/tv, music, reading, games, drawing/coloring/painting or other art, essential oils, exercise, cleaning/organizing, puzzles, crossword puzzles, word search, Sudoku       I can also download a meditation or relaxation andrew, like Calm, Headspace, or Insight Timer (all three offer a free version)     A great website resource is Change to Chill with active coping skills     Changes I can make to support my mental health and wellness: Attend scheduled mental health appointments. Take my medications as prescribed. Maintain a daily schedule/routine. Abstain from all mood altering substances, including drugs, alcohol, or medications not currently prescribed to me. Implement a self-care routine.       People in my life that I can ask for help: my mom, my girlfriend, my sister, walk-in counseling,  urgent care        [facility] has a mental health crisis team you can call 24/7: [facility] Crisis Line Number: [phone]     Other things that are important when I m in crisis: remember help is available, return to ED if symptoms persist         Crisis Lines  Crisis Text Line  Text 197967  You will be connected with a trained live crisis counselor to provide support.     Por espanol, texto  MIHAELA a 731560 o texto a 442-AYUDAME en WhatsApp     National Hope Line  1.800.SUICIDE [9229222]        Community Resources  Fast Tracker  Linking people to mental health and substance use disorder resources  fasttrackermn.org      [place] Mental Health Warm Line  Peer to peer support  Monday thru Saturday, 12 pm to 10 pm  590.247.4060 or 3.364.898.7806  Text \"Support\" to 32289     National [place] [facility] Mental Illness (GLORIA)  735.687.6749 or 1.888.GLORIA.HELPS        Mental Health Apps  My3  https://my3app.org/     VirtualHopeBox  https://Scripted.org/apps/virtual-hope-box/        Additional Information  Today you were seen by a licensed mental health professional " through Triage and Transition services, Behavioral Healthcare Providers ([facility])  for a crisis assessment in the Emergency Department at [facility].  It is recommended that you follow up with your established providers (psychiatrist, mental health therapist, and/or primary care doctor - as relevant) as soon as possible. Coordinators from [facility] will be calling you in the next 24-48 hours to ensure that you have the resources you need.  You can also contact [facility] coordinators directly at [phone]. You may have been scheduled for or offered an appointment with a mental health provider. [facility] maintains an extensive network of licensed behavioral health providers to connect patients with the services they need.  We do not charge providers a fee to participate in our referral network.  We match patients with providers based on a patient's specific needs, insurance coverage, and location.  Our first effort will be to refer you to a provider within your care system, and will utilize providers outside your care system as needed.

## 2023-10-26 NOTE — ED NOTES
Patient is reassessed at 3 PM on October 26.  I was asked to reexamine the patient as apparently the psychiatry clinical psychologist as well as a psychiatrist as I have cleared him for discharge.  Patient is not known by me patient was admitted and has been sitting in the emergency room for 45 hours.  On examination patient is well-appearing his demeanor is appropriate he is not hallucinating and seems cooperative.  Patient states he feels a lot better and is requesting to go home.  We will go ahead with the psychiatrist and mental health plan with discharge and outpatient resources due to the lack of psychiatry resources in the hospital boarding for 45 hours and patient's clinical improvement.     Dony Dietrich MD  10/26/23 5999

## 2023-10-26 NOTE — CONSULTS
"        Initial Psychiatric Consult   Consult date: October 26, 2023         Reason for Consult, requesting source:    SI   Requesting source:     This note is being entered to supplement the psychiatry consultation note that was completed on October 24 and 25, 2023 by the licensed mental health professional Siobhan Lopez and MARIJA Stevens  . They have reviewed with me the pertinent clinical details related to their encounter. I am being consulted to offer additional guidance on psychiatric pharmacological interventions.     Total time spent in chart review, patient interview and coordination of care; 55 min     Telemedicine Visit: The patient was seen for a visit utilizing the [employer] system. Permission from the patient to conduct the exam by telemedicine was obtained prior to proceeding.  He was also informed that insurance will be billed for this contact.   Start Time: 1400  Stop Time: 1430   Patient Location):  [facility]   Provider Location: [facility]  As the provider I attest to compliance with applicable laws and regulations related to telemedicine          HPI:   Per ED MD note 10/24:   \"Marcus Olguin is a 28 year old male who presents via EMS with PD for suicidal ideation and ingestion. Per PD, EMS was called by the patient's parents because he was threatening to kill himself. PD says that he was threatening to kill himself and held a screwdriver to his neck.  He was asking that please officera shoot and kill him\"     When seen by Siobhan Lopez and MARIJA Stevens he was still suicidal, but he has found talking to them to be very helpful to put his problems into perspective and want him to engage in outpatient psychotherapy.   If he was to discharge today he has a safe place to return to and plans to obtain a loan to help out with his bills. He assures me that he would feel safe being discharged rather than going to  psych.           " "Physical ROS:   The 10 point Review of Systems is negative other than noted in the HPI or here.         Medications:     He had not needed any of the PRNs for agitation          Physical and Psychiatric Examination:     BP (!) 140/90   Pulse 60   Temp 99  F (37.2  C) (Oral)   Resp 16   SpO2 100%   Weight is 0 lbs 0 oz  There is no height or weight on file to calculate BMI.    Physical Exam:  I have reviewed the physical exam as documented by by the medical team and agree with findings and assessment and have no additional findings to add at this time.    Mental Status Exam:  Appearance: awake, alert and adequately groomed  Attitude:  cooperative  Eye Contact:  good  Mood:   \"OK\"  Affect:  mood congruent  Speech:  clear, coherent  Psychomotor Behavior:  no evidence of tardive dyskinesia, dystonia, or tics  Muscle strength and tone: intact   Throught Process:  logical and linear  Associations:  no loose associations  Thought Content:  no evidence of suicidal ideation or homicidal ideation  Insight:  fair  Judgement:  fair  Oriented to:  time, person, and place  Attention Span and Concentration:  fair  Recent and Remote Memory:  fair  Language: able to name/identify objects without impairment  Fund of Knowledge: intact with awareness of current and past events             DSM-5 Diagnosis:   Adjustment disorder, mixed           Assessment:   In my opinion he does not need to be admitted to IP psychiatry; he is forward thinking, has found the interactions with the LMHPs very helpful, and is looking forward to starting psychotherapy            Summary of Recommendations:   You may drop the 72 hour hold and suicide precautions, then discharge to home   An option for psychotherapy is in the AVS.     Page me as needed.  Emiliano West M.D.   [facility]   Contact information available via [facility] Paging/Directory or MySiteApp  If I am not available, then [facility] CL line ([phone]) " "should know who   Is covering our consult service.         \"This dictation was performed with voice recognition software and may contain errors,  omissions and inadvertent word substitution.\"           "

## 2023-10-26 NOTE — TELEPHONE ENCOUNTER
R: [place]  Access Inpatient Bed Call Log 10/26/23 @ 1:00am   Intake has called facilities that have not updated their bed status within the last 12 hours.??     *METRO:  [place] -- [facility]: @ cap per website.  [place] -- [facility]:  @ cap per website.  [place] -- [facility]: @ cap per website.  [place] -- [facility]: TEMPORARILY CLOSED DUE TO COVID EXPOSURE.  [place] -- [facility]: @ cap per website.  [facility] -- [facility]: @ cap per website.  [facility]/ beds - POSTING 1 BED. Ages 18-25, Voluntary only, COVID test req'd, NO aggression, physical or sexual assault, violence hx or drug abuse  [place] -- [facility]: @ cap per website.  [place] -- [email or web address] @ cap per website.  [place] -- [facility]: @ cap per website.    *STATEWIDE (by distance):  [facility]: POSTING 2 BEDS. Mixed unit/Low acuity only.   [facility]: @ cap per website. Low acuity, No aggression  [facility]: @ cap per website.   [facility]: POSTING 1 BED. Low acuity only. No current aggression.  [facility]:  @ cap per website. COVID negative test req. Lower acuity only  [facility]: @ cap per website. Low acuity only. Prefer med adjustments placement.  [place] [facility]: @ cap per website.  No aggression  [place] -- [facility]: POSTING 2 BEDS. NO reviews after 10PM. Low acuity only.  [place] -- [facility]: POSTING 2 BEDS. No hx of aggression. No sexual offenders. Voluntary patients only.  [place] -- [facility]: POSTING 5 BEDS. Low acuity only. Must have the cognitive ability to do programming. No aggressive or violent behavior or recent HX in the last 2 yrs. MH must be primary.   [place] -- [facility]: POSTING 1 BED.  COVID negative test. Must be low acuity ONLY.  [place] -- [facility]: POSTING 2 BEDS. Low acuity. Negative Covid.   [place] -- [facility]: POSTING 3 BEDS. Covid neg. Voluntary only. Mixed unit  of adults & adol. No aggressive or violent behavior. No registered sex offenders.   [place] -- [facility]: POSTING 1 BED. COVID negative test. PER RN, FULL FOR HIGH ACUITY.  [facility]: @ [facility] per website. No hx of aggression/assault. No lines, drains or tubes. Does not provide detox or CD treatment.   [place] -- [facility]: POSTING 10 BEDS. Out-of-State Facility.  [place] -- [facility]: POSTING 3 BEDS. Mixed unit/Low acuity/no medical devices - IV, CPAP etc.      Pt remains on waitlist pending appropriate placement availability

## 2023-10-26 NOTE — TELEPHONE ENCOUNTER
"3:33 PM Intake received message from EC \"please remove from work list, patient will be discharging home with outpatient resources.\" Patient removed from [facility] Intake no longer following for placement.  "

## 2023-11-06 ENCOUNTER — TELEPHONE (OUTPATIENT)
Dept: BEHAVIORAL HEALTH | Facility: CLINIC | Age: 28
End: 2023-11-06

## 2023-11-06 ENCOUNTER — HOSPITAL ENCOUNTER (EMERGENCY)
Facility: CLINIC | Age: 28
Discharge: HOME | End: 2023-11-07
Attending: EMERGENCY MEDICINE | Admitting: EMERGENCY MEDICINE

## 2023-11-06 ENCOUNTER — APPOINTMENT (OUTPATIENT)
Dept: GENERAL RADIOLOGY | Facility: CLINIC | Age: 28
End: 2023-11-06
Attending: EMERGENCY MEDICINE

## 2023-11-06 DIAGNOSIS — R94.31 NONSPECIFIC ABNORMAL ELECTROCARDIOGRAM (ECG) (EKG): ICD-10-CM

## 2023-11-06 DIAGNOSIS — R07.9 CHEST PAIN, UNSPECIFIED TYPE: ICD-10-CM

## 2023-11-06 DIAGNOSIS — R94.31 QT PROLONGATION: ICD-10-CM

## 2023-11-06 DIAGNOSIS — F10.920 ALCOHOL INTOXICATION, UNCOMPLICATED (H): ICD-10-CM

## 2023-11-06 DIAGNOSIS — R45.851 SUICIDAL IDEATION: Primary | ICD-10-CM

## 2023-11-06 PROBLEM — F10.20 ALCOHOL USE DISORDER, SEVERE, DEPENDENCE (H): Status: ACTIVE | Noted: 2023-11-06

## 2023-11-06 LAB
ALBUMIN SERPL BCG-MCNC: 5 G/DL (ref 3.5–5.2)
ALBUMIN UR-MCNC: 70 MG/DL
ALP SERPL-CCNC: 69 U/L (ref 40–129)
ALT SERPL W P-5'-P-CCNC: 38 U/L (ref 0–70)
AMPHETAMINES UR QL SCN: ABNORMAL
ANION GAP SERPL CALCULATED.3IONS-SCNC: 23 MMOL/L (ref 7–15)
APPEARANCE UR: CLEAR
AST SERPL W P-5'-P-CCNC: 60 U/L (ref 0–45)
B-OH-BUTYR SERPL-SCNC: 2.6 MMOL/L
BARBITURATES UR QL SCN: ABNORMAL
BASOPHILS # BLD AUTO: 0 10E3/UL (ref 0–0.2)
BASOPHILS NFR BLD AUTO: 0 %
BENZODIAZ UR QL SCN: ABNORMAL
BILIRUB SERPL-MCNC: 0.8 MG/DL
BILIRUB UR QL STRIP: NEGATIVE
BUN SERPL-MCNC: 13.2 MG/DL (ref 6–20)
BZE UR QL SCN: ABNORMAL
CALCIUM SERPL-MCNC: 9.5 MG/DL (ref 8.6–10)
CANNABINOIDS UR QL SCN: ABNORMAL
CHLORIDE SERPL-SCNC: 93 MMOL/L (ref 98–107)
COLOR UR AUTO: YELLOW
CREAT SERPL-MCNC: 1.2 MG/DL (ref 0.67–1.17)
DEPRECATED HCO3 PLAS-SCNC: 21 MMOL/L (ref 22–29)
EGFRCR SERPLBLD CKD-EPI 2021: 86 ML/MIN/1.73M2
EOSINOPHIL # BLD AUTO: 0.1 10E3/UL (ref 0–0.7)
EOSINOPHIL NFR BLD AUTO: 1 %
ERYTHROCYTE [DISTWIDTH] IN BLOOD BY AUTOMATED COUNT: 13.4 % (ref 10–15)
ETHANOL SERPL-MCNC: 0.06 G/DL
FENTANYL UR QL: ABNORMAL
GLUCOSE SERPL-MCNC: 100 MG/DL (ref 70–99)
GLUCOSE UR STRIP-MCNC: NEGATIVE MG/DL
HCT VFR BLD AUTO: 45.4 % (ref 40–53)
HGB BLD-MCNC: 15 G/DL (ref 13.3–17.7)
HGB UR QL STRIP: NEGATIVE
IMM GRANULOCYTES # BLD: 0 10E3/UL
IMM GRANULOCYTES NFR BLD: 0 %
KETONES UR STRIP-MCNC: 80 MG/DL
LEUKOCYTE ESTERASE UR QL STRIP: NEGATIVE
LIPASE SERPL-CCNC: 15 U/L (ref 13–60)
LYMPHOCYTES # BLD AUTO: 0.8 10E3/UL (ref 0.8–5.3)
LYMPHOCYTES NFR BLD AUTO: 8 %
MAGNESIUM SERPL-MCNC: 1.9 MG/DL (ref 1.7–2.3)
MCH RBC QN AUTO: 30.7 PG (ref 26.5–33)
MCHC RBC AUTO-ENTMCNC: 33 G/DL (ref 31.5–36.5)
MCV RBC AUTO: 93 FL (ref 78–100)
MONOCYTES # BLD AUTO: 0.5 10E3/UL (ref 0–1.3)
MONOCYTES NFR BLD AUTO: 4 %
MUCOUS THREADS #/AREA URNS LPF: PRESENT /LPF
NEUTROPHILS # BLD AUTO: 9.2 10E3/UL (ref 1.6–8.3)
NEUTROPHILS NFR BLD AUTO: 87 %
NITRATE UR QL: NEGATIVE
NRBC # BLD AUTO: 0 10E3/UL
NRBC BLD AUTO-RTO: 0 /100
OPIATES UR QL SCN: ABNORMAL
PCP QUAL URINE (ROCHE): ABNORMAL
PH UR STRIP: 6 [PH] (ref 5–7)
PLATELET # BLD AUTO: 240 10E3/UL (ref 150–450)
POTASSIUM SERPL-SCNC: 3.7 MMOL/L (ref 3.4–5.3)
PROT SERPL-MCNC: 7.6 G/DL (ref 6.4–8.3)
RBC # BLD AUTO: 4.89 10E6/UL (ref 4.4–5.9)
RBC URINE: <1 /HPF
SARS-COV-2 RNA RESP QL NAA+PROBE: NEGATIVE
SODIUM SERPL-SCNC: 137 MMOL/L (ref 135–145)
SP GR UR STRIP: 1.03 (ref 1–1.03)
TROPONIN T SERPL HS-MCNC: 11 NG/L
TROPONIN T SERPL HS-MCNC: 12 NG/L
UROBILINOGEN UR STRIP-MCNC: 2 MG/DL
WBC # BLD AUTO: 10.6 10E3/UL (ref 4–11)
WBC URINE: 1 /HPF

## 2023-11-06 PROCEDURE — 258N000003 HC RX IP 258 OP 636

## 2023-11-06 PROCEDURE — 83735 ASSAY OF MAGNESIUM: CPT

## 2023-11-06 PROCEDURE — 96375 TX/PRO/DX INJ NEW DRUG ADDON: CPT

## 2023-11-06 PROCEDURE — 250N000013 HC RX MED GY IP 250 OP 250 PS 637

## 2023-11-06 PROCEDURE — 258N000003 HC RX IP 258 OP 636: Performed by: EMERGENCY MEDICINE

## 2023-11-06 PROCEDURE — 80307 DRUG TEST PRSMV CHEM ANLYZR: CPT

## 2023-11-06 PROCEDURE — 87635 SARS-COV-2 COVID-19 AMP PRB: CPT

## 2023-11-06 PROCEDURE — 250N000011 HC RX IP 250 OP 636: Mod: JZ

## 2023-11-06 PROCEDURE — 84484 ASSAY OF TROPONIN QUANT: CPT | Performed by: EMERGENCY MEDICINE

## 2023-11-06 PROCEDURE — 96365 THER/PROPH/DIAG IV INF INIT: CPT

## 2023-11-06 PROCEDURE — 93005 ELECTROCARDIOGRAM TRACING: CPT | Mod: RTG

## 2023-11-06 PROCEDURE — 250N000013 HC RX MED GY IP 250 OP 250 PS 637: Performed by: EMERGENCY MEDICINE

## 2023-11-06 PROCEDURE — 71046 X-RAY EXAM CHEST 2 VIEWS: CPT

## 2023-11-06 PROCEDURE — 80053 COMPREHEN METABOLIC PANEL: CPT

## 2023-11-06 PROCEDURE — 36415 COLL VENOUS BLD VENIPUNCTURE: CPT

## 2023-11-06 PROCEDURE — 96361 HYDRATE IV INFUSION ADD-ON: CPT

## 2023-11-06 PROCEDURE — 82077 ASSAY SPEC XCP UR&BREATH IA: CPT

## 2023-11-06 PROCEDURE — 83690 ASSAY OF LIPASE: CPT

## 2023-11-06 PROCEDURE — 96366 THER/PROPH/DIAG IV INF ADDON: CPT

## 2023-11-06 PROCEDURE — 85025 COMPLETE CBC W/AUTO DIFF WBC: CPT

## 2023-11-06 PROCEDURE — 99285 EMERGENCY DEPT VISIT HI MDM: CPT | Mod: 25

## 2023-11-06 PROCEDURE — 82010 KETONE BODYS QUAN: CPT

## 2023-11-06 PROCEDURE — 81001 URINALYSIS AUTO W/SCOPE: CPT | Performed by: EMERGENCY MEDICINE

## 2023-11-06 PROCEDURE — 36415 COLL VENOUS BLD VENIPUNCTURE: CPT | Performed by: EMERGENCY MEDICINE

## 2023-11-06 RX ORDER — ONDANSETRON 2 MG/ML
4 INJECTION INTRAMUSCULAR; INTRAVENOUS ONCE
Status: COMPLETED | OUTPATIENT
Start: 2023-11-06 | End: 2023-11-06

## 2023-11-06 RX ORDER — MAGNESIUM SULFATE HEPTAHYDRATE 40 MG/ML
2 INJECTION, SOLUTION INTRAVENOUS ONCE
Status: COMPLETED | OUTPATIENT
Start: 2023-11-06 | End: 2023-11-06

## 2023-11-06 RX ORDER — MULTIPLE VITAMINS W/ MINERALS TAB 9MG-400MCG
1 TAB ORAL ONCE
Status: COMPLETED | OUTPATIENT
Start: 2023-11-06 | End: 2023-11-06

## 2023-11-06 RX ORDER — FOLIC ACID 1 MG/1
1 TABLET ORAL ONCE
Status: COMPLETED | OUTPATIENT
Start: 2023-11-06 | End: 2023-11-06

## 2023-11-06 RX ORDER — MAGNESIUM HYDROXIDE/ALUMINUM HYDROXICE/SIMETHICONE 120; 1200; 1200 MG/30ML; MG/30ML; MG/30ML
15 SUSPENSION ORAL ONCE
Status: COMPLETED | OUTPATIENT
Start: 2023-11-06 | End: 2023-11-06

## 2023-11-06 RX ADMIN — THIAMINE HCL TAB 100 MG 100 MG: 100 TAB at 15:26

## 2023-11-06 RX ADMIN — ALUMINUM HYDROXIDE, MAGNESIUM HYDROXIDE, AND SIMETHICONE 15 ML: 200; 200; 20 SUSPENSION ORAL at 19:17

## 2023-11-06 RX ADMIN — SODIUM CHLORIDE 1000 ML: 9 INJECTION, SOLUTION INTRAVENOUS at 18:57

## 2023-11-06 RX ADMIN — MAGNESIUM SULFATE HEPTAHYDRATE 2 G: 2 INJECTION, SOLUTION INTRAVENOUS at 19:18

## 2023-11-06 RX ADMIN — ONDANSETRON 4 MG: 2 INJECTION INTRAMUSCULAR; INTRAVENOUS at 15:26

## 2023-11-06 RX ADMIN — Medication 5 MG: at 23:10

## 2023-11-06 RX ADMIN — MULTIPLE VITAMINS W/ MINERALS TAB 1 TABLET: TAB at 15:26

## 2023-11-06 RX ADMIN — FOLIC ACID 1 MG: 1 TABLET ORAL at 15:26

## 2023-11-06 RX ADMIN — SODIUM CHLORIDE 1000 ML: 9 INJECTION, SOLUTION INTRAVENOUS at 15:27

## 2023-11-06 ASSESSMENT — ENCOUNTER SYMPTOMS
ACTIVITY CHANGE: 1
HALLUCINATIONS: 0
VOMITING: 1
APPETITE CHANGE: 1

## 2023-11-06 ASSESSMENT — ACTIVITIES OF DAILY LIVING (ADL)
ADLS_ACUITY_SCORE: 35

## 2023-11-06 NOTE — CONSULTS
Diagnostic Evaluation Consultation  Crisis Assessment    Patient Name: Marcus Olguin  Age:  28 year old  Legal Sex: male  Gender Identity: male  Pronouns: he/him  Race: Black or   Ethnicity: Not  or   Language: English      Patient was assessed: In person      Patient location: [facility] EMERGENCY DEPT                             ED05    Referral Data and Chief Complaint  Marcus Olguin presents to the ED by  self. Patient is presenting to the ED for the following concerns: Suicidal ideation, Substance use. Factors that make the mental health crisis life threatening or complex are: Pt came to the ED due to experiencing increased SI.      Informed Consent and Assessment Methods  Explained the crisis assessment process, including applicable information disclosures and limits to confidentiality, assessed understanding of the process, and obtained consent to proceed with the assessment.  Assessment methods included conducting a formal interview with patient, review of medical records, collaboration with medical staff, and obtaining relevant collateral information from family and community providers when available.  : done     Patient response to interventions: acceptance expressed, verbalizes understanding  Coping skills were attempted to reduce the crisis:  Came to ED.     History of the Crisis   Pt was recently seen in the ED on 10/7/23 and 10/24/23 for SI, OD on tylenol on the 24th. Pt reported not having any supports in [place] and feeling lonely. Pt stated all of his family moved to [place] and his fiance broke up with him and moved to [place]. Pt still talks to her and has a hard time seeing her doing well and him being depressed. Pt expressed having fiancial concerns regarding being denied from a job he was looking forward to due to failing a background check. Pt expressed he has keven drinking daily for the past 2-3 years and now states he has to get black out  "drunk every night just to sleep. Pt reported drinking about a liter of alcohol a day. Pt reported he has not been able to take care of his ADLs like showering and cleaning. Pt expressed not being able to eat and has lost a lot of weight. Pt stated he does not have a TV at home and his phone is broken so he is \"left to sit with my thoughts\". Pt reported trying to take his ex-fiance's Lexapro to see if it would help for baout 3 days but stopped taking it due to it giving him heart palpitations. Writer encouraged Pt to not take medications that are not prescribed to him. Pt reported having court tomorrow which is also a stressor. Pt expressed him and his family agreeing he needs to go live with them in [place] and they will help him figure that out. Pt endorsed continued SI and does not feel safe to go home right now. Pt does not have any outpatient mental health services. Pt denied any current SIB, HI, or A/VH. Pt was tearful and cooperative through out assessment.    Brief Psychosocial History  Family:  , Children no  Support System:  Parent(s)  Employment Status:  unemployed  Source of Income:     Financial Environmental Concerns:  eviction pending, insurance, none, unable to afford rent/mortgage, unemployed  Current Hobbies:  television/movies/videos, group/social activities, family functions, music, sports/team sports  Barriers in Personal Life:  mental health concerns, emotional concerns, financial concerns    Significant Clinical History  Current Anxiety Symptoms:  excessive worry, anxious  Current Depression/Trauma:  sense of doom, crying or feels like crying, impaired decision making, irritable, sadness, thoughts of death/suicide  Current Somatic Symptoms:     Current Psychosis/Thought Disturbance:     Current Eating Symptoms:  loss of appetite, recent weight loss  Chemical Use History:  Alcohol: Blackouts, Daily  Last Use:: 11/05/23  Benzodiazepines: None  Opiates: None  Cocaine: None  Marijuana: " "None  Other Use: None   Past diagnosis:  No known past diagnosis  Family history:  No known history of mental health or chemical health concerns  Past treatment:  No known formal treatment attempts  Details of most recent treatment:  Patient denies any mental health treatment history.       Collateral Information  Is there collateral information: Yes     Collateral information name, relationship, phone number:  LORI VILLELA (Sister)  [phone]    What happened today: Lori reported Pt was hung over this morning and wants to go and be around family.     What is different about patient's functioning: Lori reported Pt has been having financial concerns and and is going through a break up. Pt attempted suicide a couple weeks ago and has court soon. Lori and her mom are going to talk today to figure out how to get him down to [place] with them.     Concern about alcohol/drug use: No    What do you think the patient needs: Lori thinks Pt needs support.    Has patient made comments about wanting to kill themselves/others: yes (Not since the last time he attempted suicide)    If d/c is recommended, can they take part in safety/aftercare planning:  yes       Risk Assessment  Edwards Suicide Severity Rating Scale Full Clinical Version:  Suicidal Ideation  Q1 Wish to be Dead (Lifetime): Yes  Q2 Non-Specific Active Suicidal Thoughts (Lifetime): Yes  3. Active Suicidal Ideation with any Methods (Not Plan) Without Intent to Act (Lifetime): Yes  Q4 Active Suicidal Ideation with Some Intent to Act, Without Specific Plan (Lifetime): Yes  Q5 Active Suicidal Ideation with Specific Plan and Intent (Lifetime): Yes  Q6 Suicide Behavior (Lifetime): yes     Suicidal Behavior (Lifetime)  Actual Attempt (Lifetime): Yes  Total Number of Actual Attempts (Lifetime): 1  Actual Attempt Description (Lifetime): Pt attemepted suicide 10/24 via taking 8-9 tylenol. Pt stated this was more a \"cry for help\".  Has subject engaged in " "non-suicidal self-injurious behavior? (Lifetime): No  Interrupted Attempts (Lifetime): No  Aborted or Self-Interrupted Attempt (Lifetime): No  Preparatory Acts or Behavior (Lifetime): No    Yell Suicide Severity Rating Scale Recent:   Suicidal Ideation (Recent)  Q1 Wished to be Dead (Past Month): yes  Q2 Suicidal Thoughts (Past Month): yes  Q3 Suicidal Thought Method: yes  Q4 Suicidal Intent without Specific Plan: no  Q5 Suicide Intent with Specific Plan: yes  Level of Risk per Screen: high risk  Intensity of Ideation (Recent)  Most Severe Ideation Rating (Past 1 Month): 4  Description of Most Severe Ideation (Past 1 Month): Pt reported having continuous SI  Frequency (Past 1 Month): Many times each day  Duration (Past 1 Month): 1-4 hours/a lot of time  Controllability (Past 1 Month): Can control thoughts with a lot of difficulty  Deterrents (Past 1 Month): Uncertain that deterrents stopped you  Reasons for Ideation (Past 1 Month): Completely to end or stop the pain (You couldn't go on living with the pain or how you were feeling)  Suicidal Behavior (Recent)  Actual Attempt (Past 3 Months): Yes  Total Number of Actual Attempts (Past 3 Months): 1  Actual Attempt Description (Past 3 Months): Pt attemepted suicide 10/24 via taking 8-9 tylenol. Pt stated this was more a \"cry for help\".  Has subject engaged in non-suicidal self-injurious behavior? (Past 3 Months): No  Interrupted Attempts (Past 3 Months): No  Aborted or Self-Interrupted Attempt (Past 3 Months): No  Preparatory Acts or Behavior (Past 3 Months): No    Environmental or Psychosocial Events: legal issues such as DWI, DUI, lawsuit, CPS involvement, etc., loss of a loved one, loss of a relationship due to divorce/separation, work or task failure, challenging interpersonal relationships, geographic isolation from supports, barriers to accessing healthcare, helplessness/hopelessness, impulsivity/recklessness, unemployment/underemployment, unstable housing, " homelessness, excessive debt, poor finances, ongoing abuse of substances, social isolation, neither working nor attending school  Protective Factors: Protective Factors: responsibilities and duties to others, including pets and children, strong bond to family unit, community support, or employment, help seeking, good treatment engagement, sense of importance of health and wellness    Does the patient have thoughts of harming others? Feels Like Hurting Others: no  Previous Attempt to Hurt Others: no  Is the patient engaging in sexually inappropriate behavior?: no    Is the patient engaging in sexually inappropriate behavior?  no        Mental Status Exam   Affect:  (Tearful)  Appearance: Appropriate  Attention Span/Concentration: Attentive  Eye Contact: Engaged    Fund of Knowledge: Appropriate   Language /Speech Content: Fluent  Language /Speech Volume: Normal  Language /Speech Rate/Productions: Normal  Recent Memory: Intact  Remote Memory: Intact  Mood: Sad, Depressed  Orientation to Person: Yes   Orientation to Place: Yes  Orientation to Time of Day: Yes  Orientation to Date: Yes     Situation (Do they understand why they are here?): Yes  Psychomotor Behavior: Normal  Thought Content: Suicidal  Thought Form: Intact        Medication  Psychotropic medications:   Medication Orders - Psychiatric (From admission, onward)      None             Current Care Team  Patient Care Team:  No Ref-Primary, Physician as PCP - General    Diagnosis  Patient Active Problem List   Diagnosis Code    Adjustment disorder with mixed anxiety and depressed mood F43.23    Alcohol use disorder, severe, dependence (H) F10.20       Primary Problem This Admission  Active Hospital Problems    Alcohol use disorder, severe, dependence (H)      *Adjustment disorder with mixed anxiety and depressed mood        Clinical Summary and Substantiation of Recommendations   Pt came to the ED due to experiencing increased SI. Pt was recently seen in the ED  "on 10/7/23 and 10/24/23 for SI, OD on tylenol on the 24th. Pt reported not having any supports in [place] and feeling lonely. Pt stated all of his family moved to [place] and his fiance broke up with him and moved to [place]. Pt still talks to her and has a hard time seeing her doing well and him being depressed. Pt expressed having fiancial concerns regarding being denied from a job he was looking forward to due to failing a background check. Pt expressed he has keven drinking daily for the past 2-3 years and now states he has to get black out drunk every night just to sleep. Pt reported drinking about a liter of alcohol a day. Pt reported he has not been able to take care of his ADLs like showering and cleaning. Pt expressed not being able to eat and has lost a lot of weight. Pt stated he does not have a TV at home and his phone is broken so he is \"left to sit with my thoughts\". Pt reported trying to take his ex-fiance's Lexapro to see if it would help for baout 3 days but stopped taking it due to it giving him heart palpitations. Writer encouraged Pt to not take medications that are not prescribed to him. Pt reported having court tomorrow which is also a stressor. Pt expressed him and his family agreeing he needs to go live with them in [place] and they will help him figure that out. Pt endorsed continued SI and does not feel safe to go home right now. Pt does not have any outpatient mental health services. Pt denied any current SIB, HI, or A/VH. Pt agreed to stay and be put on the inpatient work list. Pt was tearful and cooperative through out assessment.    It is the recommendation of this clinician that pt admit to IP  for safety and stabilization. Pt displays the following risk factors that support IP admission: SI. Pt is unable to engage in safety planning to mitigate risk level in a non-secure setting. Lower levels of care have not been successful in mitigating risk. Due to this IP is the least " restrictive option of care for pt. Pt should remain in IP until deemed safe to return to the community and engage in OP MH supports. Pt will need assistance establishing OP MH services prior to discharge.         Imminent risk of harm: Suicidal Behavior  Severe psychiatric, behavioral or other comorbid conditions are appropriate for management at inpatient mental health as indicated by at least one of the following: Comorbid substance use disorder, Psychiatric Symptoms  Severe dysfunction in daily living is present as indicated by at least one of the following: Complete inability to maintain any appropriate aspect of personal responsibility in any adult roles  Situation and expectations are appropriate for inpatient care: Patient management/treatment at lower level of care is not feasible or is inappropriate  Inpatient mental health services are necessary to meet patient needs and at least one of the following: Specific condition related to admission diagnosis is present and judged likely to further improve at proposed level of care      Patient coping skills attempted to reduce the crisis:  Came to ED.    Disposition  Recommended disposition: Inpatient Mental Health        Reviewed case and recommendations with attending provider. Attending Name: Harsha Pedraza DO       Attending concurs with disposition: yes       Patient and/or validated legal guardian concurs with disposition:   yes       Final disposition:  inpatient mental health    Legal status on admission: Voluntary/Patient has signed consent for treatment    Assessment Details   Total duration spent with the patient: 35 min     CPT code(s) utilized: 83129 - Psychotherapy for Crisis - 60 (30-74*) min    Siobhan Lopez Psychotherapist  [facility] - Triage & Transition Services  Callback: [phone]

## 2023-11-06 NOTE — ED PROVIDER NOTES
History     Chief Complaint:  Suicidal       HPI   Marcus Olguin is a 28 year old male with a history of depression who presents for evaluation of depression. The patient states that he was previously engaged and he and his fiancee would share a liter of alcohol almost every night. He notes that they broke up a month ago and were trying to work on communication, but recently his fiancee has not been wanting to work with him on the relationship. He also notes that he was recently let go from a job. He states that he has not been caring for himself at home by not showing, not eating, and not cleaning as he would normally do. He tried to start Escitalopram as directed a few days ago, but this gave the patient palpitations and therefore he discontinued this. The patient also states that since the breakup he has been drinking in more excess than previously in order to help himself sleep and has been blacking out most nights. He notes that his last drink was last night at 2130. This morning, the patient developed a burning sensation in his chest and had a few episodes of vomiting, which is consistent with previous episodes of alcohol cessation. He denies any previous history of alcohol withdrawal and denies any previous seizures or DT. The patient notes that he has had thoughts of self harm and suicidal ideation, but denies any current plans. He denies any visual/auditory hallucinations or homicidal ideation. He denies any other drug use.       Independent Historian:   None - Patient Only    Review of External Notes:   10/24/23: Patient evaluated in ED for intentional drug overdose, admitted to inpatient psychiatric facility.    Medications:    No active medications.    Past Medical History:    Asthma  Adjustment disorder with mixed anxiety and depressed mood    Past Surgical History:    No pertinent past surgical history.     Physical Exam   Patient Vitals for the past 24 hrs:   BP Temp Temp src Pulse Resp SpO2    11/06/23 1325 (!) 167/99 97.1  F (36.2  C) Temporal 107 20 100 %     Physical Exam  General: Alert, appears well-developed and well-nourished. Cooperative.     In moderate distress.   HEENT:  Head:  Atraumatic  Ears:  External ears are normal  Eyes:   Conjunctivae normal and EOM are normal. No scleral icterus.    Pupils are equal, round, and reactive to light.   Neck:   Normal range of motion. Neck supple.  CV:  Mildly tachycardic, regular rhythm, normal heart sounds and radial pulses are 2+ and symmetric.  No murmur.  Resp:  Breath sounds are clear bilaterally    Non-labored, no retractions or accessory muscle use  GI:  Mild TTP over lower abdomen. Abdomen is soft, no distension. No rebound or guarding.  MS:  Normal range of motion. No edema.    Normal strength in all 4 extremities.     Back atraumatic.    No midline cervical, thoracic, or lumbar tenderness  Skin:  Warm and dry.  No rash or lesions noted.  Neuro:   Alert. Normal strength.  Sensation intact in all 4 extremities. GCS: 15  Psych: Normal affect, intermittently tearful.    Emergency Department Course   EKG:  ECG results from 11/06/23   EKG 12-lead, tracing only     Value    Systolic Blood Pressure     Diastolic Blood Pressure     Ventricular Rate 99    Atrial Rate 99    MS Interval 126    QRS Duration 98        QTc 539    P Axis 88    R AXIS 0    T Axis 46    Interpretation ECG      Sinus rhythm  Incomplete right bundle branch block  Prolonged QT  Abnormal ECG  When compared with ECG of 24-OCT-2023 17:01,  Incomplete right bundle branch block is now Present  Interpreted by Dr. Pedraza.   EKG 12 lead     Value    Systolic Blood Pressure     Diastolic Blood Pressure     Ventricular Rate 85    Atrial Rate 85    MS Interval 136    QRS Duration 94        QTc 464    P Axis 83    R AXIS -1    T Axis -13    Interpretation ECG      Sinus rhythm with sinus arrhythmia  Incomplete right bundle branch block  Borderline ECG  When compared with ECG of  06-NOV-2023 19:00, (unconfirmed)  Nonspecific T wave abnormality, worse in Inferior leads  QT has shortened  Interpreted by Dr. Pedraza     Laboratory:  Labs Ordered and Resulted from Time of ED Arrival to Time of ED Departure   COMPREHENSIVE METABOLIC PANEL - Abnormal       Result Value    Sodium 137      Potassium 3.7      Carbon Dioxide (CO2) 21 (*)     Anion Gap 23 (*)     Urea Nitrogen 13.2      Creatinine 1.20 (*)     GFR Estimate 86      Calcium 9.5      Chloride 93 (*)     Glucose 100 (*)     Alkaline Phosphatase 69      AST 60 (*)     ALT 38      Protein Total 7.6      Albumin 5.0      Bilirubin Total 0.8     ETHYL ALCOHOL LEVEL - Abnormal    Alcohol ethyl 0.06 (*)    CBC WITH PLATELETS AND DIFFERENTIAL - Abnormal    WBC Count 10.6      RBC Count 4.89      Hemoglobin 15.0      Hematocrit 45.4      MCV 93      MCH 30.7      MCHC 33.0      RDW 13.4      Platelet Count 240      % Neutrophils 87      % Lymphocytes 8      % Monocytes 4      % Eosinophils 1      % Basophils 0      % Immature Granulocytes 0      NRBCs per 100 WBC 0      Absolute Neutrophils 9.2 (*)     Absolute Lymphocytes 0.8      Absolute Monocytes 0.5      Absolute Eosinophils 0.1      Absolute Basophils 0.0      Absolute Immature Granulocytes 0.0      Absolute NRBCs 0.0     LIPASE - Normal    Lipase 15     MAGNESIUM - Normal    Magnesium 1.9     KETONE BETA-HYDROXYBUTYRATE QUANTITATIVE, RAPID   COVID-19 VIRUS (CORONAVIRUS) BY PCR        Procedures   None    Emergency Department Course & Assessments:    PSS-3      Date and Time Over the past 2 weeks have you felt down, depressed, or hopeless? Over the past 2 weeks have you had thoughts of killing yourself? Have you ever attempted to kill yourself? When did this last happen? User   11/06/23 1325 yes yes yes -- RAT          C-SSRS ([place])      Date and Time Q1 Wished to be Dead (Past Month) Q2 Suicidal Thoughts (Past Month) Q3 Suicidal Thought Method Q4 Suicidal Intent without Specific Plan Q5  Suicide Intent with Specific Plan Q6 Suicide Behavior (Lifetime) Within the Past 3 Months? RETIRED: Level of Risk per Screen Screening Not Complete User   11/06/23 1606 yes yes yes no yes -- -- -- --    11/06/23 1604 -- -- -- -- -- yes -- -- --                 Suicide assessment completed by mental health (D.E.C., LCSW, etc.)    Interventions:  Medications   sodium chloride 0.9% BOLUS 1,000 mL (1,000 mLs Intravenous $New Bag 11/6/23 1527)   thiamine (B-1) tablet 100 mg (100 mg Oral $Given 11/6/23 1526)   folic acid (FOLVITE) tablet 1 mg (1 mg Oral $Given 11/6/23 1526)   multivitamin w/minerals (THERA-VIT-M) tablet 1 tablet (1 tablet Oral $Given 11/6/23 1526)   ondansetron (ZOFRAN) injection 4 mg (4 mg Intravenous $Given 11/6/23 1526)        Assessments:  1457: Initial evaluation and assessment.    Independent Interpretation (X-rays, CTs, rhythm strip):  None    Consultations/Discussion of Management or Tests:  Patient was seen in conjunction with Dr. Pedraza.   1607: Discussed the case with Siobhan from [facility] who stated that patient was in agreement with voluntary inpatient psychiatric admission. Would need to be reassessed be [facility] if he would like to discharge home prior to this admission.     Social Determinants of Health affecting care:   Patient has history of depression and intentional drug overdose. Currently going through breakup with fiancee and feels as though he is socially isolating himself.     Disposition:  The patient will board in the emergency department pending bed placement. Care was signed out to Dr. Barlow.     Impression & Plan    CMS Diagnoses: None    Medical Decision Making:  Marcus Olguin is a 28 year old male with a history of depression who presents for evaluation of depression.  On exam, the patient has mild lower abdominal tenderness with regular rate and rhythm and clear breath sounds bilaterally. Initially, the patient is hypertensive and mildly tachycardic without fever or hypoxia which  is likely due from alcohol withdrawal.  The patient states that he is been drinking daily for the past year and is increased the amount of alcohol intake over the past week.  Blood work shows no evidence of leukocytosis or anemia.  The patient is noted to have an elevated anion gap of 23 with a mildly increased creatinine.  Alcohol level is 0.06 and ketones are significant elevated at 2.6 consistent with alcoholic ketoacidosis and/or starvation ketosis.  Provided patient with fluids, vitamins, and Zofran for alcohol intoxication with ketoacidosis.  Lipase is within normal limits reducing our suspicion for pancreatitis.  Magnesium is also within normal limits.  UA shows ketones without any signs of infection.  Serial troponins are negative which were obtained as the patient was complaining of chest pain earlier today.  EKG shows evidence of QT prolongation for which we provided magnesium.  Repeat EKG shows improved QTC prolongation without evidence of ischemia.  We discussed the case with Siobhan from [facility] who stated that the patient was in agreement with voluntary inpatient psychiatric admission.  She noted that the patient will need to be reassessed by [facility] if he elected to discharge at any time.  The patient will board in the emergency department pending bed placement and the remainder of his care was signed out to Dr. Barlow.      Diagnosis:    ICD-10-CM    1. Suicidal ideation  R45.851       2. Alcohol intoxication, uncomplicated (H24)  F10.920            Caterina Lizama PA-C  11/6/2023       Caterina Lizama PA-C  11/06/23 2242       Caterina Lizama PA-C  11/06/23 3606

## 2023-11-06 NOTE — ED TRIAGE NOTES
"Having mental health problems. Not eating, no energy to do anything. Has to get blackout drunk to get to sleep. Having thoughts of killing himself or \"ending it\".         "

## 2023-11-06 NOTE — TELEPHONE ENCOUNTER
S: [facility] ED , DEC  Siobhan  calling at 4:09 PM   about a 28 year old/Male presenting with SI with Plan      B: Pt arrived via Self . Presenting problem, stressors: Family has moved out of state, he is unemployed and fiance broke up with him.     Pt affect in ED: Calm and Tearful  Pt Dx: Adjustment Disorder  Previous IPMH hx? No  Pt endorses SI with a plan to Overdose     Hx of suicide attempt? Yes: 10/24/2023  Pt denies SIB  Pt denies HI   Pt denies hallucinations .   Pt RARS Score: 3    Hx of aggression/violence, sexual offenses, legal concerns, Epic care plan? describe: NO   Current concerns for aggression this visit? No  Does pt have a history of Civil Commitment? No  Is Pt their own guardian? Yes    Pt is not prescribed medication. Is patient medication compliant? N/A  Pt denies OP services   CD concerns: Actively using/consuming Alcohol   Acute or chronic medical concerns: No   Does Pt present with specific needs, assistive devices, or exclusionary criteria? None      Pt is ambulatory  Pt is able to perform ADLs independently      A: Pt to be reviewed for [facility] admission. Pt is Voluntary  Preferred placement: [facility]    COVID Symptoms: No  If yes, COVID test required   Utox: Not ordered, intake to request lab    CMP: Abnormalities: Carbon Dioxide 21,  Anion Gap 23 Creatinine 1.20 Chloride 93, Glucose 100, AST 60  CBC: Abnormalities: Absolute Neutrophils 9.2   HCG: N/A    R: Patient cleared and ready for behavioral bed placement: Yes  Pt placed on IP worklist? Yes    Does Patient need a Transfer Center request created? Yes, writer completed Transfer Center request at: 5:13 PM

## 2023-11-06 NOTE — ED NOTES
"Agree with triage- patient states he's originally from [place] and all of his family moved back there so is hoping to move back there at some point. Recently his fiancee broke up with him and he is still in his house that he shared with her so \"there are a lot of memories still\". Patient has been drinking alcohol heavily since the beginning of October \"all day every day\" and states his last drink was last night around 2100 and had a liter of mahi before going to the bar. Patient states \"my mental health is just so bad right now, I can't eat, I can't sleep\". Patient did not want to change in to scrubs at this time so sweatshirt and shoes were removed and placed in belonging bag with wallet and phone.   "

## 2023-11-06 NOTE — ED NOTES
"Marcus Olguin  November 6, 2023  Plan of Care Hand-off Note     Patient Care Path: inpatient mental health    Plan for Care:   It is the recommendation of this clinician that Pt admit to IP MH for safety and stabilization. Pt displays the following risk factors that support IP admission: SI. Pt is unable to engage in safety planning to mitigate risk level in a non-secure setting. Lower levels of care have not been successful in mitigating risk. Due to this IP is the least restrictive option of care for pt. Pt should remain in IP until deemed safe to return to the community and engage in OP MH supports. Pt will need assistance establishing OP MH services prior to discharge.    Identified Goals and Safety Issues: No imminent safety concerns at this time. Pt will need to be reassessed if he wants to leave.    Overview:  Pt came to the ED due to experiencing increased SI. Pt was recently seen in the ED on 10/7/23 and 10/24/23 for SI, OD on tylenol on the 24th. Pt reported not having any supports in [place] and feeling lonely. Pt stated all of his family moved to [place] and his fiance broke up with him and moved to [place]. Pt still talks to her and has a hard time seeing her doing well and him being depressed. Pt expressed having fiancial concerns regarding being denied from a job he was looking forward to due to failing a background check. Pt expressed he has keven drinking daily for the past 2-3 years and now states he has to get black out drunk every night just to sleep. Pt reported drinking about a liter of alcohol a day. Pt reported he has not been able to take care of his ADLs like showering and cleaning. Pt expressed not being able to eat and has lost a lot of weight. Pt stated he does not have a TV at home and his phone is broken so he is \"left to sit with my thoughts\". Pt reported trying to take his ex-fiance's Lexapro to see if it would help for baout 3 days but stopped taking it due to it giving him heart " palpitations. Writer encouraged Pt to not take medications that are not prescribed to him. Pt reported having court tomorrow which is also a stressor. Pt expressed him and his family agreeing he needs to go live with them in [place] and they will help him figure that out. Pt endorsed continued SI and does not feel safe to go home right now. Pt does not have any outpatient mental health services. Pt denied any current SIB, HI, or A/VH. Pt agreed to stay and be put on the inpatient work list. Pt was tearful and cooperative through out assessment.             Legal Status: Legal Status at Admission: Voluntary/Patient has signed consent for treatment    Psychiatry Consult: Order placed       Updated regarding plan of care: Caterina Lizama PA-C, Harsha Yes, DO, and JUS Mitchell MA

## 2023-11-06 NOTE — ED PROVIDER NOTES
ED ATTENDING PHYSICIAN NOTE:   I evaluated this patient in conjunction with Caterina Lizama PA-C  I have participated in the care of the patient and personally performed key elements of the history, exam, and medical decision making.      HPI:   Marcus Olguin is a 28 year old male with a history of depression who presents for evaluation of depression. He notes that he broke up with his fiancée a month ago and since then he has been more depressed and drinking heavily each day.  He has been drinking in significant excess daily in order to help himself sleep and has been blacking out most nights. He notes that his last drink was last night at 2130. This morning, patient found himself awoken to himself in the process of gurgling on his vomitus which thankfully he was able to roll over to his side and went to the restroom.  He finally decided that he is unable to live like this and decided to come into the ER for help.  Patient states that he has not been able to find much emotional support.  He reports that he has been having suicidal thoughts. He denies any homicidal ideations or hallucinations.  In regards to his alcohol use, he denies history of alcohol withdrawal or withdrawal seizures.    Incidentally during review of system, patient has noted that he has been having some intermittent sternal chest pains and burning sensation that he feels like is from his alcohol use and vomiting.    Review of Systems   Constitutional:  Positive for activity change and appetite change.   HENT:  Negative for congestion and rhinorrhea.    Respiratory:  Negative for cough and shortness of breath.    Cardiovascular:  Positive for chest pain.   Gastrointestinal:  Positive for nausea and vomiting. Negative for abdominal pain.   Genitourinary:  Negative for dysuria and hematuria.   Musculoskeletal:  Negative for back pain and neck pain.   Neurological:  Negative for dizziness and headaches.   Psychiatric/Behavioral:  Positive for  self-injury (ideas) and suicidal ideas. Negative for hallucinations.      Independent Historian:   None - Patient Only    Review of External Notes:   10/24/23 Prior ED visit note     EXAM:   Constitutional:       Appearance: Normal appearance.      General: Not in acute distress.  HENT:      Head: Normocephalic and atraumatic.   Eyes:      Extraocular Movements: Extraocular movements intact.      Conjunctiva/sclera: Conjunctivae normal.   Cardiovascular:      Rate and Rhythm: Normal rate and regular rhythm.   Pulmonary:      Effort: Pulmonary effort is normal. No respiratory distress.   Abdominal:      General: Abdomen is flat. There is no distension.      Palpation: No tenderness to palpation.  Musculoskeletal:         General: No swelling or deformity.      Cervical back: Normal range of motion. No rigidity.   Skin:     Coloration: Skin is not jaundiced or pale.   Neurological:      General: No focal deficit present.  No tremors.     Mental Status: Alert and oriented to person, place, and time.   Psychiatric:         Mood and Affect: Mood normal.         Behavior: Behavior normal.    ECG  ECG results from 11/06/23   EKG 12-lead, tracing only     Value    Systolic Blood Pressure     Diastolic Blood Pressure     Ventricular Rate 99    Atrial Rate 99    DC Interval 126    QRS Duration 98        QTc 539    P Axis 88    R AXIS 0    T Axis 46    Interpretation ECG      Sinus rhythm  Incomplete right bundle branch block  Prolonged QT  Abnormal ECG  When compared with ECG of 24-OCT-2023 17:01,  Incomplete right bundle branch block is now Present     EKG 12 lead     Value    Systolic Blood Pressure     Diastolic Blood Pressure     Ventricular Rate 85    Atrial Rate 85    DC Interval 136    QRS Duration 94        QTc 464    P Axis 83    R AXIS -1    T Axis -13    Interpretation ECG      Sinus rhythm with sinus arrhythmia  Incomplete right bundle branch block  Borderline ECG  When compared with ECG of 06-NOV-2023  19:00, (unconfirmed)  Nonspecific T wave abnormality, worse in Inferior leads  QT has shortened       Independent Interpretation (X-rays, CTs, rhythm strip):  On my independent interpretation of CXR, there is no obvious focal opacity, mediastinal widening, or pneumothorax.    Consultations/Discussion of Management or Tests:  ED Course as of 11/07/23 0303   Mon Nov 06, 2023   1723 Ketone Quantitative(!!): 2.60   1907 EKG 12-lead, tracing only  EKG normal sinus rhythm.  Rate of 99.  Normal NC and QRS.  QTc prolonged at 539.  Nonspecific ST changes throughout EKG in addition to new incomplete right bundle branch block as compared with 10/24/2023 EKG.  Noticeable new ST depression in leads II and III.  Nonspecific ST changes in leads aVF and V3.  These are new compared to prior EKG.    Magnesium ordered for prolonged Qtc, will repeat EKG after magnesium.   2211 EKG 12 lead  Normal sinus rhythm with sinus arrhythmia.  Incomplete right bundle branch block.  Rate of 85.  Nonspecific ST changes in inferior leads similar as compared with prior but improved.  Improvement of QTc.    2 set cardiac enzyme negative. Unlikely ACS/pericarditis/myocarditis. Afebrile and no significant leukocytosis. Suspect EKG changes secondary to metabolic derangements and recent heavy alcohol usage. Nonetheless, re-assuring Qtc improvement after magnesium. Will continue to monitor for signs and symptoms such as chest pain or shortness of breath. Otherwise medically cleared to outpatient follow-up regarding these findings. Low risk heart score. No evidence of withdrawal at this time.     Social Determinants of Health affecting care:   Healthcare Access/Compliance, Employment/Unemployment, Stress/Adjustment Disorders, and Social Connections/Isolation     MEDICAL DECISION MAKING/ASSESSMENT AND PLAN:   28-year-old male as described above presents to the emergency department for suicidal ideation and heavy alcohol use for the past month after being  broken up by significant other.  Patient hemodynamically stable to evaluation.  Afebrile.  Mildly tachycardic.  Alcohol level 0.06.  Mild BIANKA with creatinine of 1.2.  Beta hydroxybutyrate ordered by APC demonstrates ketones of 2.6.  Suspect alcoholic ketoacidosis/starvation ketosis.  At this time, patient has not exhibited any signs of withdrawal. Continue CIWA vital sign checks. Patient endorsing some heartburn sensation since he has been drinking heavily and vomiting.  Unlikely ACS.  Will obtain cardiac enzyme for screening for cardiac disease such as pericarditis/myocarditis. EKG. Chest x-ray for evaluation for aspiration pneumonitis/mediastinal free air.  Patient also noticed darkening of his urine recently likely secondary to dehydration and mild BIANKA.  Nonetheless, will obtain UA for evaluation for UTI.  Barring significant withdrawal symptoms or additional abnormal work-up, plan for inpatient mental health admit.  Discussed care plan with patient who voiced understanding and agreement plan.  Answered all questions.  Additional work-up and orders as listed in chart.    Please refer to ED course above for details on the patient's treatment course and any changes or updates in care plan beyond my initial evaluation and MDM.    Please refer to PA-C H&P for details.    HEART Score  Background  Calculates the overall risk of adverse event in patient's presenting with chest pain.  Based on 5 criteria (each assigned 0-2 points) including suspiciousness of history, EKG, age, risk factors and troponin.    Data  28 year old male  has Adjustment disorder with mixed anxiety and depressed mood and Alcohol use disorder, severe, dependence (H) on their problem list.   has no history on file for tobacco use.  family history is not on file.  Recent Labs   Lab 11/06/23  1937 11/06/23  1528   CTROPT 11 12     Criteria   0-2 points for each of 5 items (maximum of 10 points):  Score 0- History slightly suspicious for coronary  syndrome  Score 1- EKG with Non-specific repolarization disturbance  Score 0- Age <45 years old  Score 0- No risk factors for atherosclerotic disease  Score 0- Within normal limits for troponin levels  Interpretation  Risk of adverse outcome  Heart Score: 1  Total Score 0-3- Adverse Outcome Risk 2.5% - Supports early discharge with appropriate follow-up       DIAGNOSIS:     ICD-10-CM    1. Suicidal ideation  R45.851       2. Alcohol intoxication, uncomplicated (H24)  F10.920       3. Chest pain, unspecified type  R07.9       4. QT prolongation  R94.31       5. Nonspecific abnormal electrocardiogram (ECG) (EKG)  R94.31           DISPOSITION:   Care of the patient was transferred to my colleague Dr. Barlow pending inpatient mental health placement.     Scribe Disclosure:  NAYAN, Marium Mackey, am serving as a scribe at 4:27 PM on 11/6/2023 to document services personally performed by Harsha Pedraza DO based on my observations and the provider's statements to me.   11/6/2023  [facility] EMERGENCY DEPT     Harsha Pedraza DO  11/07/23 0253       Harsha Pedraza DO  11/07/23 0256       Harsha Pedraza DO  11/07/23 0303

## 2023-11-07 ENCOUNTER — TELEPHONE (OUTPATIENT)
Dept: BEHAVIORAL HEALTH | Facility: CLINIC | Age: 28
End: 2023-11-07

## 2023-11-07 VITALS
TEMPERATURE: 97.8 F | RESPIRATION RATE: 16 BRPM | HEART RATE: 71 BPM | DIASTOLIC BLOOD PRESSURE: 86 MMHG | SYSTOLIC BLOOD PRESSURE: 164 MMHG | OXYGEN SATURATION: 99 %

## 2023-11-07 LAB
ATRIAL RATE - MUSE: 85 BPM
ATRIAL RATE - MUSE: 99 BPM
DIASTOLIC BLOOD PRESSURE - MUSE: NORMAL MMHG
DIASTOLIC BLOOD PRESSURE - MUSE: NORMAL MMHG
INTERPRETATION ECG - MUSE: NORMAL
INTERPRETATION ECG - MUSE: NORMAL
P AXIS - MUSE: 83 DEGREES
P AXIS - MUSE: 88 DEGREES
PR INTERVAL - MUSE: 126 MS
PR INTERVAL - MUSE: 136 MS
QRS DURATION - MUSE: 94 MS
QRS DURATION - MUSE: 98 MS
QT - MUSE: 390 MS
QT - MUSE: 420 MS
QTC - MUSE: 464 MS
QTC - MUSE: 539 MS
R AXIS - MUSE: -1 DEGREES
R AXIS - MUSE: 0 DEGREES
SYSTOLIC BLOOD PRESSURE - MUSE: NORMAL MMHG
SYSTOLIC BLOOD PRESSURE - MUSE: NORMAL MMHG
T AXIS - MUSE: -13 DEGREES
T AXIS - MUSE: 46 DEGREES
VENTRICULAR RATE- MUSE: 85 BPM
VENTRICULAR RATE- MUSE: 99 BPM

## 2023-11-07 PROCEDURE — 99244 OFF/OP CNSLTJ NEW/EST MOD 40: CPT

## 2023-11-07 ASSESSMENT — ENCOUNTER SYMPTOMS
BACK PAIN: 0
RHINORRHEA: 0
NECK PAIN: 0
COUGH: 0
ABDOMINAL PAIN: 0
NAUSEA: 1
HEADACHES: 0
DYSURIA: 0
HEMATURIA: 0
DIZZINESS: 0
SHORTNESS OF BREATH: 0

## 2023-11-07 ASSESSMENT — ACTIVITIES OF DAILY LIVING (ADL)
ADLS_ACUITY_SCORE: 35

## 2023-11-07 ASSESSMENT — COLUMBIA-SUICIDE SEVERITY RATING SCALE - C-SSRS
LETHALITY/MEDICAL DAMAGE CODE MOST LETHAL ACTUAL ATTEMPT: MODERATE PHYSICAL DAMAGE, MEDICAL ATTENTION NEEDED
6. HAVE YOU EVER DONE ANYTHING, STARTED TO DO ANYTHING, OR PREPARED TO DO ANYTHING TO END YOUR LIFE?: NO
TOTAL  NUMBER OF ABORTED OR SELF INTERRUPTED ATTEMPTS SINCE LAST CONTACT: NO
ATTEMPT SINCE LAST CONTACT: NO
MOST LETHAL DATE: 66771
SUICIDE, SINCE LAST CONTACT: NO
2. HAVE YOU ACTUALLY HAD ANY THOUGHTS OF KILLING YOURSELF?: NO
TOTAL  NUMBER OF INTERRUPTED ATTEMPTS SINCE LAST CONTACT: NO
1. SINCE LAST CONTACT, HAVE YOU WISHED YOU WERE DEAD OR WISHED YOU COULD GO TO SLEEP AND NOT WAKE UP?: NO

## 2023-11-07 NOTE — TELEPHONE ENCOUNTER
R: [facility] Access Inpatient Bed Call Log 11/7/2023 8:05:30 AM    Intake has called facilities that have not updated their bed status within the last 12 hours.    ADULTS:  *[facility] is posting 0 beds.              [facility] is posting 0 beds. [phone]    [facility] is posting 0 beds. [phone]              [facility] is posting 0 beds. [phone]    [facility] is posting 0 beds. [phone]            [facility] is posting 0 beds. [phone]   [facility] (These are Young Adult beds, 18-26) is posting 2 beds. Negative COVID test required, no recent or significant aggression, violence, or sexual assault. [phone] 8:07a Per Margaret, beds available.   [facility] is posting 0 beds. [phone]            [facility] is posting 0 beds. [phone]     [facility] through [facility] is posting 0 beds. [phone]        Pt remains on work list pending appropriate bed availability.     [2:20 PM] Siobhan Lopez    [MRN] remove from list, D/C.     Intake notes pt removed from active placement work list. Intake no longer following.

## 2023-11-07 NOTE — TELEPHONE ENCOUNTER
02:03 - Received call from Abby at  requesting notes and facesheet be sent for review - she only received labs  02:07 - Sent confidential email to  for review    Awaiting CB from     03:17 - PC (Abby) called back w/ follow up requests: Another EKG and BAL/breathalyzer, updated set of vitals and reason why pt is going to court. Abby also reports case will have to be reviewed w/ day shift referral team this AM    03:23 - Called ED to relay PC's requests for review. RN unsure why pt has court today and PPS is unable to find explanation in chart or DEC assessment. RN reports pt is sleeping but will attempt to complete everything this morning when pt is awake. PPS will pass to oncoming shift to follow    03:56 - PPS received notification that MD reports repeat EKG unnecessary as second EKG was normal and that GREGOR also not necessary as initial one was 0.06. PPS will reach out to     04:23 - Called PC and spoke to Abby, who reports she will pass along clinical to her day shift referral team and let them make the decision whether another EKG and GREGOR is needed. Also informed Abby that reason for pt's court date today is still unknown. PPS sent updated set of vitals to PC @ 04:25    060:43 - PC (Abby) called to report that pt was declined d/t acuity and alcohol use    Pt remains on waitlist pending appropriate placement availability

## 2023-11-07 NOTE — ED NOTES
RN ED Mental Health Handoff Note    Voluntary but holdable    Does patient require 1:1? Yes    Hold and rights been given and documented for patient: Yes    Is the patient in BH scrubs? No - searched    Has the patient been searched? Yes    Is the 15 minute observation tool up to date? Yes    Was patient issued a welcome folder? Yes    Room check completed this shift: Yes    PSS3 and [place] Assessment/Reassessment this shift:    PSS-3      Date and Time Over the past 2 weeks have you felt down, depressed, or hopeless? Over the past 2 weeks have you had thoughts of killing yourself? Have you ever attempted to kill yourself? When did this last happen? User   11/06/23 1325 yes yes yes -- RAT          C-SSRS ([place])      Date and Time Q1 Wished to be Dead (Past Month) Q2 Suicidal Thoughts (Past Month) Q3 Suicidal Thought Method Q4 Suicidal Intent without Specific Plan Q5 Suicide Intent with Specific Plan Q6 Suicide Behavior (Lifetime) Within the Past 3 Months? RETIRED: Level of Risk per Screen Screening Not Complete User   11/06/23 1606 yes yes yes no yes -- -- -- -- JF   11/06/23 1604 -- -- -- -- -- yes -- -- -- JF            Behavioral status of patient: Green    Code 21 called this shift? No    Use of restraints/seclusion this shift? No    Most recent vital signs:  Temp: 97.1  F (36.2  C) Temp src: Temporal BP: (!) 167/99 Pulse: 107   Resp: 20 SpO2: 100 %        Medications:  Scheduled medication compliance? Yes    PRN Meds administered this shift? No    Medications   sodium chloride 0.9% BOLUS 1,000 mL (1,000 mLs Intravenous $New Bag 11/6/23 1527)   thiamine (B-1) tablet 100 mg (100 mg Oral $Given 11/6/23 1526)   folic acid (FOLVITE) tablet 1 mg (1 mg Oral $Given 11/6/23 1526)   multivitamin w/minerals (THERA-VIT-M) tablet 1 tablet (1 tablet Oral $Given 11/6/23 1526)   ondansetron (ZOFRAN) injection 4 mg (4 mg Intravenous $Given 11/6/23 1526)         ADLs    Meal Provided this shift? Yes    Hygiene items  provided? Yes    ADLs completed? Yes    Date of last shower: Unknown    Any significant events this shift? No    Any information that would be helpful in caring for this patient?  Hx of drinking, watch for withdrawal    Family present/updated? No    Location of patient's belongings: DEC office    Critical Care Minutes:  Does the patient need critical care minutes documented? No
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
## 2023-11-07 NOTE — ED PROVIDER NOTES
I assumed care for the patient after signout from my colleague.  DEC assessed the patient, and reports there is a plan for the patient to be picked up by a family member, with plans for him to go down to [place] where the rest of his family lives.  He is hopeful that this will be helpful for him.  He is able to safety plan with the DEC .  They feel comfortable discharging home at this point.  Patient is currently denying any suicidal ideation.  He is cleared to discharge.    Gigi Vogel MD on 11/7/2023 at 2:03 PM       Gigi Vogel MD  11/07/23 1401

## 2023-11-07 NOTE — TELEPHONE ENCOUNTER
R: [place]  Access Inpatient Bed Call Log 11/6/2023 @ 9:45 PM    Intake has called facilities that have not updated the bed status within the last 12 hours.                               METRO ONLY     PC Reviewing-     10:26 PM Intake faxed Labs, Facesheet, Clinical to [facility] is posting 0 beds.           [facility] is posting 0 beds. [phone].  [facility] is posting 0 beds. Negative covid required.                 [facility] is posting 0 bed. Negative covid required. [phone];    [facility] is posting 0 bed. [phone]   [facility] is posting 0 beds. [phone].    [facility] is posting 2 beds for Young Adults ages 18-26. Call for details. Negative covid.  [phone].   [facility] is posting 0 beds           [facility] is posting 0 beds.   Pt remains on the work list pending appropriate bed availability.

## 2023-11-07 NOTE — ED NOTES
RN ED Mental Health Handoff Note    Voluntary    Does patient require 1:1? Yes    Hold and rights been given and documented for patient: n/a    Is the patient in BH scrubs? No -    Has the patient been searched? Yes    Is the 15 minute observation tool up to date? Yes    Was patient issued a welcome folder? Yes    Room check completed this shift: Yes    PSS3 and [place] Assessment/Reassessment this shift:    PSS-3      Date and Time Over the past 2 weeks have you felt down, depressed, or hopeless? Over the past 2 weeks have you had thoughts of killing yourself? Have you ever attempted to kill yourself? When did this last happen? User   11/06/23 1325 yes yes yes -- RAT          C-SSRS ([place])      Date and Time Q1 Wished to be Dead (Past Month) Q2 Suicidal Thoughts (Past Month) Q3 Suicidal Thought Method Q4 Suicidal Intent without Specific Plan Q5 Suicide Intent with Specific Plan Q6 Suicide Behavior (Lifetime) Within the Past 3 Months? RETIRED: Level of Risk per Screen Screening Not Complete User   11/06/23 1606 yes yes yes no yes -- -- -- -- JF   11/06/23 1604 -- -- -- -- -- yes -- -- -- JF            Behavioral status of patient: Green    Code 21 called this shift? No    Use of restraints/seclusion this shift? No    Most recent vital signs:  Temp: 97.1  F (36.2  C) Temp src: Temporal BP: 132/86 Pulse: 72   Resp: 14 SpO2: 98 %        Medications:  Scheduled medication compliance? N/a    PRN Meds administered this shift? Yes    Medications   melatonin tablet 5 mg (5 mg Oral $Given 11/6/23 2310)   sodium chloride 0.9% BOLUS 1,000 mL (0 mLs Intravenous Stopped 11/6/23 2309)   thiamine (B-1) tablet 100 mg (100 mg Oral $Given 11/6/23 1526)   folic acid (FOLVITE) tablet 1 mg (1 mg Oral $Given 11/6/23 1526)   multivitamin w/minerals (THERA-VIT-M) tablet 1 tablet (1 tablet Oral $Given 11/6/23 1526)   ondansetron (ZOFRAN) injection 4 mg (4 mg Intravenous $Given 11/6/23 1526)   sodium chloride 0.9% BOLUS 1,000 mL (0 mLs  Intravenous Stopped 11/6/23 2056)   alum & mag hydroxide-simethicone (MAALOX) suspension 15 mL (15 mLs Oral $Given 11/6/23 1917)   magnesium sulfate 2 g in 50 mL sterile water intermittent infusion (0 g Intravenous Stopped 11/6/23 2056)         ADLs    Meal Provided this shift? No    Hygiene items provided? No    ADLs completed? No, patient declined    Date of last shower: unknown    Any significant events this shift? No    Any information that would be helpful in caring for this patient?      Family present/updated? No    Location of patient's belongings: DEC office    Critical Care Minutes:  Does the patient need critical care minutes documented? No

## 2023-11-07 NOTE — ED NOTES
"Triage & Transition Services, Extended Care     Therapy Progress Note    Patient: Marcus goes by \"Marcus,\" uses he/him pronouns  Date of Service: November 7, 2023  Site of Service:  ED  Patient was seen in-person.     Presenting problem:   Marcus is followed related to  awaiting inpatient admission . Please see initial DEC/[facility] Crisis Assessment for complete assessment information. Notable concerns include Suicidal ideation, Substance use.     Individuals Present: Marcus & Siobhan Lopez MA   Session start: 1330  Session end: 1350  Session duration in minutes: 20  Session number: 1  CPT utilized: 68401 - Psychotherapy (with patient) - 30 (16-37*) min    Current Presentation:   Pt was resting on gurney watching TV upon arrival to room. Writer re-introduced self and explained their role. Pt agreed to meet for a therapeutic check-in. Pt had met with psychiatric provider Marisol Dan CNP and discussed a discharge plan which Writer is aware of. Writer and Pt discussed his plan to stay with his brother-in-law's family until Friday when he will take a train down to [place] to move in with his sister and be around all of his family he hasn't seen in almost 10 years. Pt expressed they would pick him up from the hospital once he is discharged and help him pack up his apartment. Writer inquired how Pt feels after not drinking alcohol since being in the ED. Pt expressed feeling really good about it and will try to not drink for a couple months. Pt endorsed feeling hopeful about his move and expressed many things he is looking forward to like seeing family, getting a new job, and getting an apartment. Writer validated these feelings and reminded Pt to make sure he gives himself time to process his emotions and take it one thing at a time. Pt reported talking with his ex-fiance and they agreed to try to work things out and she would move with him in the future to [place]. Pt expressed feeling good and that he has been " "able to eat and sleep a lot better. Since Pt plans on moving to [place] Friday we could not schedule him with therapy, but free/low cost clinics and resources were given. Pt expressed interest in these services and stated \"I will definitely use them\". Pt denies any current SI/SIB/HI, & A/VH.     Mental Status Exam:   Appearance: awake, alert  Attitude: cooperative  Eye Contact: good  Mood:  \"hopeful\"  Affect: appropriate and in normal range  Speech: clear, coherent  Psychomotor Behavior: no evidence of tardive dyskinesia, dystonia, or tics  Thought Process:  logical and goal oriented  Associations: no loose associations  Thought Content: no evidence of suicidal ideation or homicidal ideation and no evidence of psychotic thought  Insight: good  Judgement: intact  Oriented to: time, person, and place  Attention Span and Concentration: intact  Recent and Remote Memory: intact    [place] Suicide Severity Rating Scale Since Last Contact: 11/7/23  Suicidal Ideation (Since Last Contact)  1. Wish to be Dead (Since Last Contact): No  2. Non-Specific Active Suicidal Thoughts (Since Last Contact): No  Suicidal Behavior (Since Last Contact)  Actual Attempt (Since Last Contact): No  Has subject engaged in non-suicidal self-injurious behavior? (Since Last Contact): No  Interrupted Attempts (Since Last Contact): No  Aborted or Self-Interrupted Attempt (Since Last Contact): No  Preparatory Acts or Behavior (Since Last Contact): No  Suicide (Since Last Contact): No  Actual/Potential Lethality (Most Lethal Attempt)  Most Lethal Attempt Date: 10/24/23  Actual Lethality/Medical Damage Code (Most Lethal Attempt): Moderate physical damage, medical attention needed  C-SSRS Risk (Since Last Contact)  Calculated C-SSRS Risk Score (Since Last Contact): No Risk Indicated    Diagnosis:     Adjustment disorder with mixed anxiety and depressed mood F43.23    Alcohol use disorder, severe, dependence (H)    F10.20           Therapeutic " Intervention(s):   Provided active listening, unconditional positive regard, and validation. Engaged in safety planning.  Engaged in cognitive restructuring/ reframing, looked at common cognitive distortions and challenged negative thoughts. Reviewed healthy living that supports positive mental health, including looking at sleep hygiene, regular movement, nutrition, and regular socialization. Provided positive reinforcement for progress towards goals, gains in knowledge, and application of skills previously taught.     Treatment Objective(s) Addressed:   The focus of this session was on rapport building, orienting the patient to therapy, safety planning, identifying an appropriate aftercare plan, building self-esteem, and identifying additional supports.     Progress Towards Goals:   Patient reports improving symptoms. Patient is making progress towards treatment goals as evidenced by denying any SI.     Case Management:   Pt provided with outpatient resources.    General Recommendations:   Continue to monitor for harm. Consider: Use a positive, direct and calm approach. Pt's tend to match the energy/mood of the staff. Keep focus positive and upbeat, Provide the pt with options to provide a sense of control. Try to tell the pt what they can do instead of what they can't do, and Listen in a neutral, non-judgmental way. Offer reassurance    Plan:   Discharge: After mental health crisis assessment and aftercare planning by care team and consultation with attending provider, the patient's circumstances and mental state were safe for outpatient management. Patient denies any mental health or safety concerns at this time. Close follow-up with a psychiatrist and/or therapist was recommended and community psychiatric resources were provided. Patient is to return to the ED if any urgent or potentially life-threatening concerns arise.        At the time of discharge, the patient's acute suicide risk was determined to be low  due to the following factors: reduction in the intensity of mood/anxiety symptoms that preceded the admission, denial of suicidal thoughts, denies feeling helpless or hopeless, not currently under the influence of alcohol or illicit substances, denies experiencing command hallucinations and no immediate access to firearms. Protective factors include: social support, displays resiliency, future focused thinking, displays insight and safe/stable housing.      Plan for Care reviewed with Assigned Medical Provider? Yes. Provider, Gigi Vogel MD, response: agrees to POC.     Siobhan Lopez MA  Licensed Mental Health Professional (LMHP), [facility]  [phone]    Resources:     Free Counseling Services  COUNSELING SERVICES ARE COMPLETELY FREE AND ANONYMOUS. [employer] is a non-profit, non-Hol[employer] organization, All of our professional counselors volunteer their time. Counseling is for individuals, couples, or families. No appointment (during clinics) or insurance is needed.     CLINIC HOURS: Please come/call/login only during clinic hours.  M, W, F:  1:00p - 3:00p for both in-person and virtual (phone and login) services     M, T, W, Th: 5:30p - 7:30p virtual services only     IN PERSON SERVICES: Come to [address] [place] [address] on Monday, Wednesday or Friday from 1 - 3 pm.     BY PHONE: Call Zoom at [phone]. When prompted, enter the meeting ID: [phone].     BY COMPUTER: Go to [email or web address]     ONE TAP MOBILE ON [employer] PHONES: To attend a clinic using the smart cell phone  one-tap mobile  button, click on this link and press the dial button on your phone:  [phone]  When Zoom answers, it may ask for a meeting id (you won t have one), so just wait until it asks you to simply press the # (number) button.     If the phone line is busy, try the phone numbers below. Try each phone number. Or, use landline phone-in instructions below.  [phone]  [phone]  [phone]  [phone]  [phone]  [phone]     Services for Irish Speakers  Services for Irish speakers remain the same.  The client can simply call our main number ([phone]), dial extension 2 and leave a message with the call-back phone number.     Counseling by Appointment  If you are interested in ongoing counseling, the first step is to see a counselor at a clinic. Ongoing counseling is arranged by request during the clinic.     Free Counseling Services  Counseling services are completely free and anonymous, with no appointment needed. SOME CLINICS ARE NOW IN PERSON! All of our professional counselors volunteer their time.     [facility]  [phone] or [phone]  info@mentalhealth[place].org  [address], [address]  [facility] [address]  Call [phone]  Text \"Support\" to 49340     CRISIS:  Text or Call 722      Aftercare Plan     Follow up with established providers and supports as scheduled. Continue taking medications as prescribed. Abstain from drugs and alcohol. Utilize your [facility] mental health crisis team as needed. They are available 24/7. Contact information is listed below.      Appointments:      Patient prefers to follow up with established providers:    [facility];     Address  [address] [place] [address]     Hours  Monday through Friday, 8 a.m. - 5:30 p.m.  Closed on Saturday, Sunday and holidays.     [facility] provides service to adults (ages 18 and over) in [facility] who are experiencing a mental health or chemical health crisis. Walk-in services include access to an onsite team of psychiatrists, nurses, social workers and trained peer support staff that provide person-centered, recovery-focused care.     [facility] offers an alternative to visiting the emergency room during a mental health crisis. You can expect excellent, professional service " in a caring, supportive environment.     Phone support is available 24/7 and mobile crisis teams are available to meet you at your home, school, workplace or in the community.     If you are in crisis, please call [phone] for assistance. Calls are answered 24/7/365.     The following mental health services are available:  24/7 crisis phone support.  Mental health crisis assessment.  Access to crisis psychiatry as eligible.  Chemical health screening.  Peer support.  Crisis stabilization services (for residents of [facility]).  Family education and support.  Referral to community resources.     Cost  There is a fee for Urgent Care services. Services are billed to insurance providers when available. A variety of payment options are available including an income-based sliding fee scale.      If I am feeling unsafe or I am in a crisis, I will:   Contact my established care providers   Call the National Suicide Prevention Lifeline: 369.407.9263   Go to the nearest emergency room   Call 911      Warning signs that I or other people might notice when a crisis is developing for me: changes to sleep, appetite or mood, increased anger, agitation or irritability, feeling depressed or hopeless, spending more time alone or talking less, increased crying, decreased productivity, seeing or hearing things that aren't there, thoughts of not wanting to live anymore or of actually killing myself, thoughts of hurting others     Things I am able to do on my own to cope or help me feel better: watching a favorite tv show or movie, listening to music I enjoy, going outside and breathing fresh air, going for a walk or exercising, taking a shower or bath, a cold or hot beverage, a healthy snack, drawing/coloring/painting, journaling, singing or dancing, deep breathing      I can try practicing square breathing when I begin to feel anxious - inhale through the nose for the count of 4 and the first line on the square.  Exhale through the mouth for the count of 4 for the second line of the square. Repeat to complete the square. Repeat the square as many times as needed.     I can also use my five senses to practice mindfulness and grounding. What are five things I can see, four things I can hear, three things I can feel, two things I can smell, and one thing I can taste.      Things that I am able to do with others to cope or help me feel better: sometimes just talking or spending time with someone else, sharing a meal or having coffee, watching a movie or playing a game, going for a walk or exercising     I can also use community resources including mental health hotlines, [facility] crisis teams, or apps.      Things I can use or do for distraction: movies/tv, music, reading, games, drawing/coloring/painting or other art, essential oils, exercise, cleaning/organizing, puzzles, crossword puzzles, word search, Sudoku       I can also download a meditation or relaxation andrew, like Calm, Headspace, or Insight Timer (all three offer a free version)     A great website resource is Change to Chill with active coping skills     Changes I can make to support my mental health and wellness: Attend scheduled mental health appointments. Take my medications as prescribed. Maintain a daily schedule/routine. Abstain from all mood altering substances, including drugs, alcohol, or medications not currently prescribed to me. Implement a self-care routine.       People in my life that I can ask for help: my mom, my girlfriend, my sister, walk-in counseling,  urgent care        Your [facility] has a mental health crisis team you can call 24/7: [facility] Crisis Line Number: [phone]     Other things that are important when I m in crisis: remember help is available, return to ED if symptoms persist         Crisis Lines  Crisis Text Line  Text 118436  You will be connected with a trained live crisis counselor to provide support.     Dianne rodriguez,  "texto  MIHAELA a 895049 o texto a 442-AYUDAME en WhatsApp     National Hope Line  1.800.SUICIDE [4174051]        Community Resources  Fast Tracker  Linking people to mental health and substance use disorder resources  [employer]Baidu      [place] Mental Health Warm Line  Peer to peer support  Monday thru Saturday, 12 pm to 10 pm  375.591.5385 or 1.592.692.4447  Text \"Support\" to 12419     National [place] on Mental Illness ([place]ORIA)  279.360.5480 or 1.888.GLORIA.HELPS        Mental Health Apps  My3  https://Geneixpp.org/     VirtualHopeBox  https://[employer]/apps/virtual-hope-box/        Additional Information  Today you were seen by a licensed mental health professional through Triage and Transition services, [facility] ([facility])  for a crisis assessment in the Emergency Department at [facility].  It is recommended that you follow up with your established providers (psychiatrist, mental health therapist, and/or primary care doctor - as relevant) as soon as possible. Coordinators from [facility] will be calling you in the next 24-48 hours to ensure that you have the resources you need.  You can also contact [facility] coordinators directly at [phone]. You may have been scheduled for or offered an appointment with a mental health provider. [facility] maintains an extensive network of licensed behavioral health providers to connect patients with the services they need.  We do not charge providers a fee to participate in our referral network.  We match patients with providers based on a patient's specific needs, insurance coverage, and location.  Our first effort will be to refer you to a provider within your care system, and will utilize providers outside your care system as needed.  "

## 2023-11-07 NOTE — PHARMACY-ADMISSION MEDICATION HISTORY
Pharmacist Admission Medication History    Admission medication history is complete. The information provided in this note is only as accurate as the sources available at the time of the update.    Information Source(s): Patient's pharmacy and Hospital records via N/A    Pertinent Information: none    Changes made to PTA medication list:  Added: None  Deleted: None  Changed: None    Medication Affordability:       Allergies reviewed with patient and updates made in EHR: unable to assess    Medication History Completed By: Gisela Chamberlain RPH 11/6/2023 8:11 PM    No outpatient medications have been marked as taking for the 11/6/23 encounter (Hospital Encounter).

## 2023-11-07 NOTE — DISCHARGE INSTRUCTIONS
Resources:     Free Counseling Services  COUNSELING SERVICES ARE COMPLETELY FREE AND ANONYMOUS. [facility] is a non-profit, non-[employer] organization, All of our professional counselors volunteer their time. Counseling is for individuals, couples, or families. No appointment (during clinics) or insurance is needed.     CLINIC HOURS: Please come/call/login only during clinic hours.  M, W, F:  1:00p - 3:00p for both in-person and virtual (phone and login) services     M, T, W, Th: 5:30p - 7:30p virtual services only     IN PERSON SERVICES: Come to [address] [place] [address] on Monday, Wednesday or Friday from 1 - 3 pm.     BY PHONE: Call Zoom at [phone]. When prompted, enter the meeting ID: [phone].     BY COMPUTER: Go to [email or web address]     ONE TAP MOBILE ON MediaPlatform: To attend a clinic using the smart cell phone  one-tap mobile  button, click on this link and press the dial button on your phone:  [phone]  When Zoom answers, it may ask for a meeting id (you won t have one), so just wait until it asks you to simply press the # (number) button.     If the phone line is busy, try the phone numbers below. Try each phone number. Or, use landline phone-in instructions below.  [phone]  [phone]  [phone]  [phone]  [phone]     Services for Kyrgyz Speakers  Services for Kyrgyz speakers remain the same.  The client can simply call our main number ([phone]), dial extension 2 and leave a message with the call-back phone number.     Counseling by Appointment  If you are interested in ongoing counseling, the first step is to see a counselor at a clinic. Ongoing counseling is arranged by request during the clinic.     Free Counseling Services  Counseling services are completely free and anonymous, with no appointment needed. SOME CLINICS ARE NOW IN PERSON! All of our professional counselors volunteer their time.     [place] WARM LINE  Mental  "[facility]  [phone] or [phone]  info@mentalhealthmn.org  [address], [address]  [facility] [facility] [address]  Call [phone]  Text \"Support\" to 60671     CRISIS:  Text or Call 293      Aftercare Plan     Follow up with established providers and supports as scheduled. Continue taking medications as prescribed. Abstain from drugs and alcohol. Utilize your [facility] mental health crisis team as needed. They are available 24/7. Contact information is listed below.      Appointments:      Patient prefers to follow up with established providers:    [facility];     Address  [address] [place] [address]     Hours  Monday through Friday, 8 a.m. - 5:30 p.m.  Closed on Saturday, Sunday and holidays.     [facility] provides service to adults (ages 18 and over) in [facility] who are experiencing a mental health or chemical health crisis. Walk-in services include access to an onsite team of psychiatrists, nurses, social workers and trained peer support staff that provide person-centered, recovery-focused care.     [facility] offers an alternative to visiting the emergency room during a mental health crisis. You can expect excellent, professional service in a caring, supportive environment.     Phone support is available 24/7 and mobile crisis teams are available to meet you at your home, school, workplace or in the community.     If you are in crisis, please call [phone] for assistance. Calls are answered 24/7/365.     The following mental health services are available:  24/7 crisis phone support.  Mental health crisis assessment.  Access to crisis psychiatry as eligible.  Chemical health screening.  Peer support.  Crisis stabilization services (for residents of [place] and [facility]).  Family education and support.  Referral to community resources.     Cost  There is a fee for Urgent Care services. " Services are billed to insurance providers when available. A variety of payment options are available including an income-based sliding fee scale.      If I am feeling unsafe or I am in a crisis, I will:   Contact my established care providers   Call the [place] Suicide Prevention Lifeline: [phone]   Go to the nearest emergency room   Call 911      Warning signs that I or other people might notice when a crisis is developing for me: changes to sleep, appetite or mood, increased anger, agitation or irritability, feeling depressed or hopeless, spending more time alone or talking less, increased crying, decreased productivity, seeing or hearing things that aren't there, thoughts of not wanting to live anymore or of actually killing myself, thoughts of hurting others     Things I am able to do on my own to cope or help me feel better: watching a favorite tv show or movie, listening to music I enjoy, going outside and breathing fresh air, going for a walk or exercising, taking a shower or bath, a cold or hot beverage, a healthy snack, drawing/coloring/painting, journaling, singing or dancing, deep breathing      I can try practicing square breathing when I begin to feel anxious - inhale through the nose for the count of 4 and the first line on the square. Exhale through the mouth for the count of 4 for the second line of the square. Repeat to complete the square. Repeat the square as many times as needed.     I can also use my five senses to practice mindfulness and grounding. What are five things I can see, four things I can hear, three things I can feel, two things I can smell, and one thing I can taste.      Things that I am able to do with others to cope or help me feel better: sometimes just talking or spending time with someone else, sharing a meal or having coffee, watching a movie or playing a game, going for a walk or exercising     I can also use community resources including mental health hotlines,  "[facility] crisis teams, or apps.      Things I can use or do for distraction: movies/tv, music, reading, games, drawing/coloring/painting or other art, essential oils, exercise, cleaning/organizing, puzzles, crossword puzzles, word search, Sudoku       I can also download a meditation or relaxation andrew, like Calm, Headspace, or Insight Timer (all three offer a free version)     A great website resource is Change to Chill with active coping skills     Changes I can make to support my mental health and wellness: Attend scheduled mental health appointments. Take my medications as prescribed. Maintain a daily schedule/routine. Abstain from all mood altering substances, including drugs, alcohol, or medications not currently prescribed to me. Implement a self-care routine.       People in my life that I can ask for help: my mom, my girlfriend, my sister, walk-in counseling,  urgent care        Your [facility] has a mental health crisis team you can call 24/7: [facility] Crisis Line Number: [phone]     Other things that are important when I m in crisis: remember help is available, return to ED if symptoms persist         Crisis Lines  Crisis Text Line  Text 915811  You will be connected with a trained live crisis counselor to provide support.     Por espanol, texto  MIHAELA a 743496 o texto a 442-AYUDAME en WhatsApp     National Hope Line  1.800.SUICIDE [3093100]        Community Resources  Fast Tracker  Linking people to mental health and substance use disorder resources  fasttrackermn.org      [place] [facility] Warm Line  Peer to peer support  Monday thru Saturday, 12 pm to 10 pm  346.898.0130 or 5.849.424.9739  Text \"Support\" to 39424     National [place] on Mental Illness ([place]OR[place])  120.683.8903 or 1.888.GLORIA.HELPS        Mental Health Apps  My3  https://my3app.org/     VirtualHopeBox  https://VeriWave.org/apps/virtual-hope-box/        Additional Information  Today you were seen by a licensed mental " health professional through Triage and Transition services, [facility]iders ([facility])  for a crisis assessment in the Emergency Department at [facility].  It is recommended that you follow up with your established providers (psychiatrist, mental health therapist, and/or primary care doctor - as relevant) as soon as possible. Coordinators from [facility] will be calling you in the next 24-48 hours to ensure that you have the resources you need.  You can also contact [facility] coordinators directly at [phone]. You may have been scheduled for or offered an appointment with a mental health provider. [facility] maintains an extensive network of licensed behavioral health providers to connect patients with the services they need.  We do not charge providers a fee to participate in our referral network.  We match patients with providers based on a patient's specific needs, insurance coverage, and location.  Our first effort will be to refer you to a provider within your care system, and will utilize providers outside your care system as needed.

## 2023-11-07 NOTE — ED NOTES
Received a call from Mallorie with behavioral intake regarding placement at [facility]. That facility is requesting updated VS (which are in the chart), a 3rd EKG, and a breathalyzer alcohol by 8 am, and wants to know why patient has court today. Physician notified of requests.   Patient is sleeping.